# Patient Record
Sex: FEMALE | Race: OTHER | Employment: UNEMPLOYED | URBAN - METROPOLITAN AREA
[De-identification: names, ages, dates, MRNs, and addresses within clinical notes are randomized per-mention and may not be internally consistent; named-entity substitution may affect disease eponyms.]

---

## 2017-04-08 ENCOUNTER — HOSPITAL ENCOUNTER (EMERGENCY)
Facility: HOSPITAL | Age: 42
Discharge: HOME | End: 2017-04-09
Payer: MEDICAID

## 2017-04-08 ENCOUNTER — APPOINTMENT (OUTPATIENT)
Dept: GENERAL RADIOLOGY | Facility: HOSPITAL | Age: 42
End: 2017-04-08
Payer: MEDICAID

## 2017-04-08 VITALS
SYSTOLIC BLOOD PRESSURE: 109 MMHG | OXYGEN SATURATION: 97 % | DIASTOLIC BLOOD PRESSURE: 67 MMHG | HEART RATE: 76 BPM | HEIGHT: 64 IN | BODY MASS INDEX: 27.31 KG/M2 | WEIGHT: 160 LBS | RESPIRATION RATE: 18 BRPM | TEMPERATURE: 98 F

## 2017-04-08 DIAGNOSIS — S90.31XA CONTUSION OF RIGHT FOOT, INITIAL ENCOUNTER: Primary | ICD-10-CM

## 2017-04-08 PROCEDURE — 99283 EMERGENCY DEPT VISIT LOW MDM: CPT

## 2017-04-08 PROCEDURE — 73630 X-RAY EXAM OF FOOT: CPT

## 2017-04-08 PROCEDURE — 73610 X-RAY EXAM OF ANKLE: CPT

## 2017-04-09 NOTE — ED PROVIDER NOTES
Patient Seen in: [facility] AND [facility] Emergency Department    History   Patient presents with:  Lower Extremity Injury (musculoskeletal)    Stated Complaint: Right foot, stepped on a toy last Friday, pain and redness    HPI    One week ago stepped on a chil Impression:  Contusion of right foot, initial encounter  (primary encounter diagnosis)    Disposition:  Discharge    Follow-up:  Janice Johnson, [address] [phone]    In 1 week        Medications Prescribe

## 2017-04-09 NOTE — ED NOTES
C/o rt foot pain 4/5 digit. States 1 wk ago stepped on a mamadou toy on side of foot. No distress, did not get seen. staes pain increasing past few day at same site.  0 bruising, 0 swelling, + cms

## 2017-10-04 ENCOUNTER — HOSPITAL ENCOUNTER (OUTPATIENT)
Age: 42
Discharge: HOME | End: 2017-10-04
Attending: EMERGENCY MEDICINE
Payer: MEDICAID

## 2017-10-04 VITALS
TEMPERATURE: 98 F | WEIGHT: 161 LBS | RESPIRATION RATE: 18 BRPM | DIASTOLIC BLOOD PRESSURE: 73 MMHG | SYSTOLIC BLOOD PRESSURE: 104 MMHG | HEART RATE: 67 BPM | OXYGEN SATURATION: 98 % | BODY MASS INDEX: 28 KG/M2

## 2017-10-04 DIAGNOSIS — S60.562A INSECT BITE HAND, LEFT, INITIAL ENCOUNTER: Primary | ICD-10-CM
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
## 2017-10-04 DIAGNOSIS — L03.114 CELLULITIS OF LEFT HAND: ICD-10-CM

## 2017-10-04 PROCEDURE — 99214 OFFICE O/P EST MOD 30 MIN: CPT

## 2017-10-04 PROCEDURE — 99213 OFFICE O/P EST LOW 20 MIN: CPT

## 2017-10-04 RX ORDER — LORAZEPAM 0.5 MG/1
TABLET ORAL
Refills: 0 | COMMUNITY
Start: 2017-09-30

## 2017-10-04 RX ORDER — CETIRIZINE HYDROCHLORIDE 10 MG/1
TABLET ORAL
Refills: 1 | COMMUNITY
Start: 2017-09-13

## 2017-10-04 RX ORDER — CLINDAMYCIN HYDROCHLORIDE 300 MG/1
300 CAPSULE ORAL 3 TIMES DAILY
Qty: 15 CAPSULE | Refills: 0 | Status: SHIPPED | OUTPATIENT
Start: 2017-10-04 | End: 2017-10-09

## 2017-10-04 RX ORDER — FLUTICASONE PROPIONATE 50 MCG
SPRAY, SUSPENSION (ML) NASAL
Refills: 0 | COMMUNITY
Start: 2017-09-13 | End: 2019-04-22

## 2017-10-04 NOTE — ED PROVIDER NOTES
Patient Seen in: [facility] AND CLINICS Immediate Care In [address] [place] [address]    History   Patient presents with:  Upper Extremity Injury (musculoskeletal)    Stated Complaint: lt hand redness    HPI    The patient is a 25-year-old female with no significant past medica is 5 out of 5 and symmetric in the upper and lower extremities bilaterally  Extremities: Mild swelling and minimal tenderness overlying the dorsal aspect of the left second MCP joint with normal range of motion of the fingers and wrist.  Skin: There is mil

## 2017-11-12 ENCOUNTER — HOSPITAL ENCOUNTER (OUTPATIENT)
Age: 42
Discharge: HOME | End: 2017-11-12
Payer: MEDICAID

## 2017-11-12 VITALS
RESPIRATION RATE: 16 BRPM | HEIGHT: 64 IN | HEART RATE: 77 BPM | DIASTOLIC BLOOD PRESSURE: 78 MMHG | OXYGEN SATURATION: 97 % | SYSTOLIC BLOOD PRESSURE: 115 MMHG | TEMPERATURE: 98 F | WEIGHT: 160 LBS | BODY MASS INDEX: 27.31 KG/M2

## 2017-11-12 DIAGNOSIS — J06.9 VIRAL URI WITH COUGH: Primary | ICD-10-CM

## 2017-11-12 PROCEDURE — 99212 OFFICE O/P EST SF 10 MIN: CPT

## 2017-11-12 NOTE — ED PROVIDER NOTES
Patient presents with:  Cough/URI      HPI:     Alen Hair is a 40year old female who presents for evaluation and management of a chief complaint of cough, runny nose and bodyaches.  Pt reports her daughter was sick a week ago and she now has similar sy effort    MDM/Assessment/Plan:   Orders for this encounter:    Well appearing 39 y/o female presents with cough, runny nose and body aches. Patient has not experienced any fevers. Patient is nontoxic in appearance.   Examination and symptoms consistent wi

## 2017-11-12 NOTE — ED INITIAL ASSESSMENT (HPI)
Coughing at night with body aches. Denies fevers, chills. STates she received the flu shot 2 weeks ago but her daughter was sick with a cough and croup at home last week. C/O left eye tearing, redness with burning that started this morning.

## 2018-02-27 ENCOUNTER — HOSPITAL ENCOUNTER (OUTPATIENT)
Age: 43
Discharge: ANOTHER HOSPITAL | End: 2018-02-27
Attending: EMERGENCY MEDICINE
Payer: MEDICAID

## 2018-02-27 ENCOUNTER — APPOINTMENT (OUTPATIENT)
Dept: CT IMAGING | Facility: HOSPITAL | Age: 43
End: 2018-02-27
Attending: EMERGENCY MEDICINE
Payer: MEDICAID

## 2018-02-27 ENCOUNTER — HOSPITAL ENCOUNTER (EMERGENCY)
Facility: HOSPITAL | Age: 43
Discharge: HOME | End: 2018-02-27
Attending: EMERGENCY MEDICINE
Payer: MEDICAID

## 2018-02-27 ENCOUNTER — APPOINTMENT (OUTPATIENT)
Dept: GENERAL RADIOLOGY | Facility: HOSPITAL | Age: 43
End: 2018-02-27
Attending: EMERGENCY MEDICINE
Payer: MEDICAID

## 2018-02-27 VITALS
DIASTOLIC BLOOD PRESSURE: 65 MMHG | TEMPERATURE: 98 F | RESPIRATION RATE: 16 BRPM | WEIGHT: 154 LBS | BODY MASS INDEX: 26.29 KG/M2 | SYSTOLIC BLOOD PRESSURE: 112 MMHG | HEIGHT: 64 IN | HEART RATE: 78 BPM | OXYGEN SATURATION: 99 %

## 2018-02-27 VITALS
RESPIRATION RATE: 18 BRPM | TEMPERATURE: 99 F | HEIGHT: 64 IN | SYSTOLIC BLOOD PRESSURE: 131 MMHG | HEART RATE: 75 BPM | WEIGHT: 154 LBS | OXYGEN SATURATION: 99 % | BODY MASS INDEX: 26.29 KG/M2 | DIASTOLIC BLOOD PRESSURE: 65 MMHG

## 2018-02-27 DIAGNOSIS — S20.211A CONTUSION OF RIB ON RIGHT SIDE, INITIAL ENCOUNTER: Primary | ICD-10-CM
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
## 2018-02-27 DIAGNOSIS — R10.9 ABDOMINAL PAIN, ACUTE: ICD-10-CM

## 2018-02-27 LAB
ANION GAP SERPL CALC-SCNC: 10 MMOL/L (ref 0–18)
BASOPHILS # BLD: 0 K/UL (ref 0–0.2)
BASOPHILS NFR BLD: 1 %
BUN SERPL-MCNC: 4 MG/DL (ref 8–20)
BUN/CREAT SERPL: 6.7 (ref 10–20)
CALCIUM SERPL-MCNC: 9.2 MG/DL (ref 8.5–10.5)
CHLORIDE SERPL-SCNC: 104 MMOL/L (ref 95–110)
CO2 SERPL-SCNC: 24 MMOL/L (ref 22–32)
CREAT SERPL-MCNC: 0.6 MG/DL (ref 0.5–1.5)
EOSINOPHIL # BLD: 0.1 K/UL (ref 0–0.7)
EOSINOPHIL NFR BLD: 1 %
ERYTHROCYTE [DISTWIDTH] IN BLOOD BY AUTOMATED COUNT: 13 % (ref 11–15)
GLUCOSE SERPL-MCNC: 91 MG/DL (ref 70–99)
HCT VFR BLD AUTO: 40 % (ref 35–48)
HGB BLD-MCNC: 13.6 G/DL (ref 12–16)
LYMPHOCYTES # BLD: 1.4 K/UL (ref 1–4)
LYMPHOCYTES NFR BLD: 23 %
MCH RBC QN AUTO: 30.1 PG (ref 27–32)
MCHC RBC AUTO-ENTMCNC: 33.9 G/DL (ref 32–37)
MCV RBC AUTO: 88.8 FL (ref 80–100)
MONOCYTES # BLD: 0.3 K/UL (ref 0–1)
MONOCYTES NFR BLD: 4 %
NEUTROPHILS # BLD AUTO: 4.5 K/UL (ref 1.8–7.7)
NEUTROPHILS NFR BLD: 72 %
OSMOLALITY UR CALC.SUM OF ELEC: 282 MOSM/KG (ref 275–295)
PLATELET # BLD AUTO: 246 K/UL (ref 140–400)
PMV BLD AUTO: 7.8 FL (ref 7.4–10.3)
POTASSIUM SERPL-SCNC: 4 MMOL/L (ref 3.3–5.1)
RBC # BLD AUTO: 4.51 M/UL (ref 3.7–5.4)
SODIUM SERPL-SCNC: 138 MMOL/L (ref 136–144)
WBC # BLD AUTO: 6.4 K/UL (ref 4–11)

## 2018-02-27 PROCEDURE — 85025 COMPLETE CBC W/AUTO DIFF WBC: CPT | Performed by: EMERGENCY MEDICINE

## 2018-02-27 PROCEDURE — 71101 X-RAY EXAM UNILAT RIBS/CHEST: CPT | Performed by: EMERGENCY MEDICINE

## 2018-02-27 PROCEDURE — 80048 BASIC METABOLIC PNL TOTAL CA: CPT | Performed by: EMERGENCY MEDICINE

## 2018-02-27 PROCEDURE — 74177 CT ABD & PELVIS W/CONTRAST: CPT | Performed by: EMERGENCY MEDICINE

## 2018-02-27 PROCEDURE — 99213 OFFICE O/P EST LOW 20 MIN: CPT

## 2018-02-27 PROCEDURE — 36415 COLL VENOUS BLD VENIPUNCTURE: CPT

## 2018-02-27 PROCEDURE — 99284 EMERGENCY DEPT VISIT MOD MDM: CPT

## 2018-02-27 RX ORDER — VALACYCLOVIR HYDROCHLORIDE 1 G/1
TABLET, FILM COATED ORAL EVERY 12 HOURS SCHEDULED
COMMUNITY

## 2018-02-27 RX ORDER — HYDROCODONE BITARTRATE AND ACETAMINOPHEN 5; 325 MG/1; MG/1
2 TABLET ORAL ONCE
Status: COMPLETED | OUTPATIENT
Start: 2018-02-27 | End: 2018-02-27

## 2018-02-27 RX ORDER — IBUPROFEN 600 MG/1
600 TABLET ORAL EVERY 8 HOURS PRN
Qty: 20 TABLET | Refills: 0 | Status: SHIPPED | OUTPATIENT
Start: 2018-02-27 | End: 2018-03-06
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
## 2018-02-27 NOTE — ED PROVIDER NOTES
Patient Seen in: [facility] AND CLINICS Immediate Care In [address] [place] [address]    History   Patient presents with:  Trauma (cardiovascular, musculoskeletal)    Stated Complaint: Fall/Rt Side Rib Pain    HPI  Yesterday the patient slipped in the bathroom striking her righ HENT:   Head: Normocephalic and atraumatic. Right Ear: External ear normal.   Left Ear: External ear normal.   Eyes: Conjunctivae and EOM are normal.   Neck: Normal range of motion. Neck supple.    Cardiovascular: Normal rate, regular rhythm, normal heart

## 2018-02-27 NOTE — ED NOTES
Care assumed from triage. Patient presents s/p fall at home this am, states she was getting into the shower when she realized water was too hot and \"jumped out and slipped\", hit R flank against tub. Denies head injury, denies LOC.  Pt states pain is aggra

## 2018-02-27 NOTE — ED INITIAL ASSESSMENT (HPI)
Slipped in shower last night and fell onto tub slamming onto rt post rib back area with slight discoloration and scratches noted easy sl painfully resp. stts was dizzy afterwards and felt like fainting -hard to deep breath.  Took an [facility] she had with s

## 2018-02-27 NOTE — ED NOTES
Discharged home with plan to follow up with PCP as indicated. Alert and interactive. Hemodynamically stable. Agrees with proposed care plan. Ambulates to exit with steady gait.

## 2018-02-27 NOTE — ED PROVIDER NOTES
Patient Seen in: [facility] AND [facility] Emergency Department    History   Patient presents with:  Fall (musculoskeletal, neurologic)    Stated Complaint: R hip and lower back pain from fall    HPI    45-year-old female with no significant past medical history HPI.  Constitutional and vital signs reviewed. All other systems reviewed and negative except as noted above.     Physical Exam   ED Triage Vitals [02/27/18 1230]  BP: 121/71  Pulse: 72  Resp: 18  Temp: 98.8 °F (37.1 °C)  Temp src: Oral  SpO2: 99 %  O2 mmol/L   Potassium 4.0 3.3 - 5.1 mmol/L   Chloride 104 95 - 110 mmol/L   CO2 24 22 - 32 mmol/L   BUN 4 (L) 8 - 20 mg/dL   Creatinine 0.60 0.50 - 1.50 mg/dL   Calcium, Total 9.2 8.5 - 10.5 mg/dL   BUN/CREA Ratio 6.7 (L) 10.0 - 20.0   Anion Gap 10 0 - 18 mmo leukocytosis, no anemia, XR without fracture/pneumothorax, CT without liver laceration hematoma  - norco was given prior to me seeing pt  - supportive care discussed      Medical Record Review: I personally reviewed available prior medical records for any

## 2018-07-25 ENCOUNTER — HOSPITAL ENCOUNTER (OUTPATIENT)
Age: 43
Discharge: HOME | End: 2018-07-25
Attending: FAMILY MEDICINE
Payer: MEDICAID

## 2018-07-25 VITALS
SYSTOLIC BLOOD PRESSURE: 123 MMHG | TEMPERATURE: 98 F | HEART RATE: 90 BPM | RESPIRATION RATE: 16 BRPM | DIASTOLIC BLOOD PRESSURE: 80 MMHG

## 2018-07-25 DIAGNOSIS — G50.1 ATYPICAL FACIAL PAIN: Primary | ICD-10-CM

## 2018-07-25 PROCEDURE — 99212 OFFICE O/P EST SF 10 MIN: CPT

## 2018-07-25 NOTE — ED PROVIDER NOTES
Pt seen in [facility] AND CLINICS Immediate Care In [place]      Stated Complaint: Patient presents with:  Nose Problem      --------------   RN assessment:     L sided nose pain/HA s/p dental work 2 d ago    --------------    CC: \"weird thing,\"    HPI:  Cor 16  Temp: 97.8 °F (36.6 °C)  Temp src: Oral  SpO2: n/a  O2 Device: n/a    Physical Exam:   General :    Alert, cooperative, no distress, appears stated age   Head:    Normocephalic, without obvious abnormality, atraumatic   Eyes:    PERRL, conjunctiva/corn Prescribed:    Current Discharge Medication List

## 2018-10-23 ENCOUNTER — HOSPITAL ENCOUNTER (OUTPATIENT)
Age: 43
Discharge: HOME | End: 2018-10-23
Attending: FAMILY MEDICINE
Payer: MEDICAID

## 2018-10-23 DIAGNOSIS — J02.9 ACUTE PHARYNGITIS, UNSPECIFIED ETIOLOGY: Primary | ICD-10-CM

## 2018-10-23 DIAGNOSIS — Z20.818 EXPOSURE TO STREP THROAT: ICD-10-CM

## 2018-10-23 PROCEDURE — 99213 OFFICE O/P EST LOW 20 MIN: CPT

## 2018-10-23 PROCEDURE — 99214 OFFICE O/P EST MOD 30 MIN: CPT

## 2018-10-23 RX ORDER — AZITHROMYCIN 250 MG/1
TABLET, FILM COATED ORAL
Qty: 1 PACKAGE | Refills: 0 | Status: SHIPPED | OUTPATIENT
Start: 2018-10-23 | End: 2019-02-11

## 2018-10-23 NOTE — ED PROVIDER NOTES
Patient presents with:  Sore Throat      HPI:     Cherylene Finders is a 44year old female who presents with for chief complaint of  sore throat  X 1 days. Patient's daughter was diagnosed with strep after positive strep test here in the clinic today.   The pharyngitis, unspecified etiology J02.9    2. Exposure to strep throat Z20.818        Take OTC Ibuprofen TID prn throat pain/fever.    Salt water gargles  Push po fluids    Z-Stephane to start if notes worsening symptoms or onset of fever and chills    Orders Pl

## 2019-01-02 ENCOUNTER — TELEPHONE (OUTPATIENT)
Dept: OBGYN | Age: 44
End: 2019-01-02

## 2019-01-02 ENCOUNTER — APPOINTMENT (OUTPATIENT)
Dept: LAB | Age: 44
End: 2019-01-02

## 2019-01-02 DIAGNOSIS — Z87.59 HISTORY OF MISCARRIAGE: Primary | ICD-10-CM

## 2019-01-02 DIAGNOSIS — Z32.01 POSITIVE URINE PREGNANCY TEST: Primary | ICD-10-CM

## 2019-01-02 LAB
HCG SERPL-ACNC: 32 MUNITS/ML
PROGEST SERPL-MCNC: 8.56 NG/ML

## 2019-01-02 PROCEDURE — 36415 COLL VENOUS BLD VENIPUNCTURE: CPT | Performed by: OBSTETRICS & GYNECOLOGY

## 2019-01-02 PROCEDURE — 84144 ASSAY OF PROGESTERONE: CPT | Performed by: OBSTETRICS & GYNECOLOGY

## 2019-01-02 PROCEDURE — 84702 CHORIONIC GONADOTROPIN TEST: CPT | Performed by: OBSTETRICS & GYNECOLOGY

## 2019-01-03 RX ORDER — VITAMIN A ACETATE, BETA CAROTENE, ASCORBIC ACID, CHOLECALCIFEROL, .ALPHA.-TOCOPHEROL ACETATE, DL-, THIAMINE MONONITRATE, RIBOFLAVIN, NIACINAMIDE, PYRIDOXINE HYDROCHLORIDE, FOLIC ACID, CYANOCOBALAMIN, CALCIUM CARBONATE, FERROUS FUMARATE, ZINC OXIDE, CUPRIC OXIDE 3080; 12; 120; 400; 1; 1.84; 3; 20; 22; 920; 25; 200; 27; 10; 2 [IU]/1; UG/1; MG/1; [IU]/1; MG/1; MG/1; MG/1; MG/1; MG/1; [IU]/1; MG/1; MG/1; MG/1; MG/1; MG/1
1 TABLET, FILM COATED ORAL DAILY
Qty: 30 TABLET | Refills: 11 | Status: SHIPPED | OUTPATIENT
Start: 2019-01-03

## 2019-01-04 PROCEDURE — 84702 CHORIONIC GONADOTROPIN TEST: CPT | Performed by: OBSTETRICS & GYNECOLOGY

## 2019-01-04 PROCEDURE — 36415 COLL VENOUS BLD VENIPUNCTURE: CPT | Performed by: OBSTETRICS & GYNECOLOGY

## 2019-01-05 LAB — HCG SERPL-ACNC: 30 MUNITS/ML

## 2019-01-07 ENCOUNTER — TELEPHONE (OUTPATIENT)
Dept: OBGYN | Age: 44
End: 2019-01-07

## 2019-01-07 DIAGNOSIS — O20.0 THREATENED ABORTION: Primary | ICD-10-CM

## 2019-01-08 ENCOUNTER — OFFICE VISIT (OUTPATIENT)
Dept: OBGYN | Age: 44
End: 2019-01-08

## 2019-01-08 ENCOUNTER — HOSPITAL (OUTPATIENT)
Dept: OTHER | Age: 44
End: 2019-01-08
Attending: OBSTETRICS & GYNECOLOGY

## 2019-01-08 ENCOUNTER — HOSPITAL (OUTPATIENT)
Dept: OTHER | Age: 44
End: 2019-01-08

## 2019-01-08 ENCOUNTER — APPOINTMENT (OUTPATIENT)
Dept: OBGYN | Age: 44
End: 2019-01-08

## 2019-01-08 VITALS — SYSTOLIC BLOOD PRESSURE: 126 MMHG | BODY MASS INDEX: 25.15 KG/M2 | WEIGHT: 142 LBS | DIASTOLIC BLOOD PRESSURE: 70 MMHG

## 2019-01-08 DIAGNOSIS — O02.1 MISSED AB: ICD-10-CM

## 2019-01-08 DIAGNOSIS — O20.9 BLEEDING IN EARLY PREGNANCY: ICD-10-CM

## 2019-01-08 DIAGNOSIS — O36.80X0 PREGNANCY OF UNKNOWN ANATOMIC LOCATION: Primary | ICD-10-CM

## 2019-01-08 LAB
ANALYZER ANC (IANC): ABNORMAL
ANALYZER ANC (IANC): ABNORMAL
BASOPHILS # BLD: 0 K/MCL (ref 0–0.3)
BASOPHILS # BLD: 0 THOUSAND/MCL (ref 0–0.3)
BASOPHILS NFR BLD: 0 %
BASOPHILS NFR BLD: 0 %
DIFFERENTIAL METHOD BLD: ABNORMAL
DIFFERENTIAL METHOD BLD: ABNORMAL
EOSINOPHIL # BLD: 0 K/MCL (ref 0.1–0.5)
EOSINOPHIL # BLD: 0 THOUSAND/MCL (ref 0.1–0.5)
EOSINOPHIL NFR BLD: 0 %
EOSINOPHIL NFR BLD: 0 %
ERYTHROCYTE [DISTWIDTH] IN BLOOD: 13.1 % (ref 11–15)
ERYTHROCYTE [DISTWIDTH] IN BLOOD: 13.1 % (ref 11–15)
HCT VFR BLD CALC: 39.2 % (ref 36–46.5)
HEMATOCRIT: 39.2 % (ref 36–46.5)
HGB BLD-MCNC: 12.7 G/DL (ref 12–15.5)
HGB BLD-MCNC: 12.7 GM/DL (ref 12–15.5)
IMM GRANULOCYTES # BLD AUTO: 0 K/MCL (ref 0–0.2)
IMM GRANULOCYTES # BLD AUTO: 0 THOUSAND/MCL (ref 0–0.2)
IMM GRANULOCYTES NFR BLD: 0 %
IMM GRANULOCYTES NFR BLD: 0 %
LYMPHOCYTES # BLD: 1.6 K/MCL (ref 1–4.8)
LYMPHOCYTES # BLD: 1.6 THOUSAND/MCL (ref 1–4.8)
LYMPHOCYTES NFR BLD: 14 %
LYMPHOCYTES NFR BLD: 14 %
MCH RBC QN AUTO: 29.5 PG (ref 26–34)
MCH RBC QN AUTO: 29.5 PG (ref 26–34)
MCHC RBC AUTO-ENTMCNC: 32.4 G/DL (ref 32–36.5)
MCHC RBC AUTO-ENTMCNC: 32.4 GM/DL (ref 32–36.5)
MCV RBC AUTO: 91.2 FL (ref 78–100)
MCV RBC AUTO: 91.2 FL (ref 78–100)
MONOCYTES # BLD: 0.6 K/MCL (ref 0.3–0.9)
MONOCYTES # BLD: 0.6 THOUSAND/MCL (ref 0.3–0.9)
MONOCYTES NFR BLD: 5 %
MONOCYTES NFR BLD: 5 %
NEUTROPHILS # BLD: 9.7 K/MCL (ref 1.8–7.7)
NEUTROPHILS # BLD: 9.7 THOUSAND/MCL (ref 1.8–7.7)
NEUTROPHILS NFR BLD: 81 %
NEUTROPHILS NFR BLD: 81 %
NEUTS SEG NFR BLD: ABNORMAL %
NEUTS SEG NFR BLD: ABNORMAL %
NRBC (NRBCRE): 0 /100 WBC
NRBC (NRBCRE): 0 /100 WBC
PLATELET # BLD: 280 K/MCL (ref 140–450)
PLATELET # BLD: 280 THOUSAND/MCL (ref 140–450)
RBC # BLD: 4.3 MIL/MCL (ref 4–5.2)
RBC # BLD: 4.3 MILLION/MCL (ref 4–5.2)
WBC # BLD: 12 K/MCL (ref 4.2–11)
WBC # BLD: 12 THOUSAND/MCL (ref 4.2–11)

## 2019-01-08 PROCEDURE — 99214 OFFICE O/P EST MOD 30 MIN: CPT | Performed by: OBSTETRICS & GYNECOLOGY

## 2019-01-08 PROCEDURE — 76830 TRANSVAGINAL US NON-OB: CPT | Performed by: OBSTETRICS & GYNECOLOGY

## 2019-01-08 PROCEDURE — 76376 3D RENDER W/INTRP POSTPROCES: CPT | Performed by: OBSTETRICS & GYNECOLOGY

## 2019-01-08 SDOH — HEALTH STABILITY: MENTAL HEALTH: HOW OFTEN DO YOU HAVE A DRINK CONTAINING ALCOHOL?: NEVER

## 2019-01-08 ASSESSMENT — ENCOUNTER SYMPTOMS
BACK PAIN: 1
SHORTNESS OF BREATH: 0

## 2019-01-09 ENCOUNTER — TELEPHONE (OUTPATIENT)
Dept: OBGYN | Age: 44
End: 2019-01-09

## 2019-01-09 DIAGNOSIS — G89.18 POSTOPERATIVE PAIN: Primary | ICD-10-CM

## 2019-01-09 RX ORDER — HYDROCODONE BITARTRATE AND ACETAMINOPHEN 5; 325 MG/1; MG/1
1 TABLET ORAL EVERY 4 HOURS PRN
Qty: 10 TABLET | Refills: 0 | Status: SHIPPED | OUTPATIENT
Start: 2019-01-09

## 2019-01-11 ENCOUNTER — NURSE ONLY (OUTPATIENT)
Dept: OBGYN | Age: 44
End: 2019-01-11

## 2019-01-11 DIAGNOSIS — O02.1 MISSED AB: Primary | ICD-10-CM

## 2019-01-11 LAB
HCG SERPL-ACNC: <2 MUNITS/ML
PATHOLOGIST NAME: NORMAL

## 2019-01-11 PROCEDURE — 36415 COLL VENOUS BLD VENIPUNCTURE: CPT

## 2019-01-11 PROCEDURE — 84702 CHORIONIC GONADOTROPIN TEST: CPT | Performed by: OBSTETRICS & GYNECOLOGY

## 2019-01-25 ENCOUNTER — E-ADVICE (OUTPATIENT)
Dept: OBGYN | Age: 44
End: 2019-01-25

## 2019-01-25 ENCOUNTER — OFFICE VISIT (OUTPATIENT)
Dept: OBGYN | Age: 44
End: 2019-01-25

## 2019-01-25 VITALS
SYSTOLIC BLOOD PRESSURE: 114 MMHG | DIASTOLIC BLOOD PRESSURE: 72 MMHG | HEIGHT: 64 IN | WEIGHT: 151 LBS | BODY MASS INDEX: 25.78 KG/M2

## 2019-01-25 DIAGNOSIS — Z09 POSTOPERATIVE FOLLOW-UP: Primary | ICD-10-CM

## 2019-01-25 PROCEDURE — 99024 POSTOP FOLLOW-UP VISIT: CPT | Performed by: OBSTETRICS & GYNECOLOGY

## 2019-02-06 ENCOUNTER — TELEPHONE (OUTPATIENT)
Dept: OBGYN | Age: 44
End: 2019-02-06

## 2019-02-11 ENCOUNTER — HOSPITAL ENCOUNTER (OUTPATIENT)
Age: 44
Discharge: HOME | End: 2019-02-11
Attending: EMERGENCY MEDICINE
Payer: MEDICAID

## 2019-02-11 VITALS
TEMPERATURE: 97 F | BODY MASS INDEX: 23.9 KG/M2 | DIASTOLIC BLOOD PRESSURE: 71 MMHG | HEART RATE: 73 BPM | HEIGHT: 64 IN | WEIGHT: 140 LBS | OXYGEN SATURATION: 100 % | SYSTOLIC BLOOD PRESSURE: 112 MMHG | RESPIRATION RATE: 18 BRPM

## 2019-02-11 DIAGNOSIS — R51.9 FACE PAIN: Primary | ICD-10-CM

## 2019-02-11 DIAGNOSIS — H92.02 EAR PAIN, LEFT: ICD-10-CM

## 2019-02-11 PROCEDURE — 99212 OFFICE O/P EST SF 10 MIN: CPT

## 2019-02-11 NOTE — ED PROVIDER NOTES
Patient Seen in: [facility] Immediate Care In [address] [place] [address]    History   Patient presents with:  Ear Problem Pain (neurosensory)    Stated Complaint: ear pain    HPI    45 yo female with left ear pain since yesterday. No recent URI symptoms.  She was hi deficit. She exhibits normal muscle tone. Skin: Skin is warm and dry. Capillary refill takes less than 2 seconds. Psychiatric: She has a normal mood and affect. Her behavior is normal.   Nursing note and vitals reviewed.            ED Course   Labs Revi

## 2019-03-05 ENCOUNTER — TELEPHONE (OUTPATIENT)
Dept: OBGYN | Age: 44
End: 2019-03-05

## 2019-03-07 ENCOUNTER — TELEPHONE (OUTPATIENT)
Dept: OBGYN | Age: 44
End: 2019-03-07

## 2019-03-16 ENCOUNTER — HOSPITAL ENCOUNTER (EMERGENCY)
Facility: HOSPITAL | Age: 44
Discharge: HOME | End: 2019-03-17
Payer: MEDICAID

## 2019-03-16 ENCOUNTER — APPOINTMENT (OUTPATIENT)
Dept: GENERAL RADIOLOGY | Facility: HOSPITAL | Age: 44
End: 2019-03-16
Payer: MEDICAID

## 2019-03-16 DIAGNOSIS — S91.342A PUNCTURE WOUND OF LEFT FOOT WITH FOREIGN BODY, INITIAL ENCOUNTER: Primary | ICD-10-CM

## 2019-03-16 PROCEDURE — 73630 X-RAY EXAM OF FOOT: CPT

## 2019-03-16 PROCEDURE — 99283 EMERGENCY DEPT VISIT LOW MDM: CPT

## 2019-03-17 VITALS
WEIGHT: 135 LBS | TEMPERATURE: 97 F | HEIGHT: 64 IN | RESPIRATION RATE: 16 BRPM | HEART RATE: 62 BPM | DIASTOLIC BLOOD PRESSURE: 76 MMHG | OXYGEN SATURATION: 100 % | SYSTOLIC BLOOD PRESSURE: 117 MMHG | BODY MASS INDEX: 23.05 KG/M2

## 2019-03-17 RX ORDER — TRAMADOL HYDROCHLORIDE 50 MG/1
50 TABLET ORAL EVERY 12 HOURS PRN
Qty: 2 TABLET | Refills: 0 | Status: SHIPPED | OUTPATIENT
Start: 2019-03-17 | End: 2019-03-18

## 2019-03-17 RX ORDER — DOXYCYCLINE HYCLATE 100 MG/1
100 CAPSULE ORAL 2 TIMES DAILY
Qty: 14 CAPSULE | Refills: 0 | Status: SHIPPED | OUTPATIENT
Start: 2019-03-17 | End: 2019-03-24
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
## 2019-03-20 ENCOUNTER — TELEPHONE (OUTPATIENT)
Dept: OBGYN CLINIC | Facility: CLINIC | Age: 44
End: 2019-03-20

## 2019-03-20 NOTE — TELEPHONE ENCOUNTER
RECEIVED RECORDS FROM URGENT CARE VISITS OVER PAST FEW YEARS. THERE ARE NO PAPS, MAMMOS, PELVIC ULTRASOUNDS. THERE IS 1 PELVIC CT WITH NO ABNORMAL GYNE FINDINGS. RECORDS IN FRONT OFFICE FOR 4-22-19 APPT WITH RAYMUNDO.

## 2019-04-04 ENCOUNTER — TELEPHONE (OUTPATIENT)
Dept: OBGYN | Age: 44
End: 2019-04-04

## 2019-04-18 ENCOUNTER — TELEPHONE (OUTPATIENT)
Dept: OBGYN CLINIC | Facility: CLINIC | Age: 44
End: 2019-04-18

## 2019-04-18 ENCOUNTER — TELEPHONE (OUTPATIENT)
Dept: OBGYN | Age: 44
End: 2019-04-18

## 2019-04-18 NOTE — TELEPHONE ENCOUNTER
Pt stated fax was sent from [facility], pls call when received.

## 2019-04-18 NOTE — TELEPHONE ENCOUNTER
NOTIFIED PT SHE WILL NEED TO CONTACT [facility] TO GET A COPY OF THE OP REPORT. SHE IS GOING TO CALL THERE AND TRY TO GO THERE TO  THE REPORT, TODAY IF POSSIBLE.

## 2019-04-18 NOTE — TELEPHONE ENCOUNTER
Pt informed we received her 2 week post op visit notes from Dr. Sterling Thurston after pt had her laparoscopy suction D&C for incomplete . Pt also informed that we received surgical pathology from [address] [place] [address] that were sent to path during surgery.  Explained to pt th

## 2019-04-18 NOTE — TELEPHONE ENCOUNTER
Pt informed that the only records we received are from urgent care visits. Pt informed of note from 3/20/19 comm. Pt stated she will call previous office and ask for records to be faxed or she will go pick them up herself. Pt has appt with RAYMUNDO on 4/22.

## 2019-04-18 NOTE — TELEPHONE ENCOUNTER
Lizette from Dr. Percy Jones office called and stated she is unable to fax operative reports, for that request,  medical records at [address] [place] [address] needs to be contacted. And pt needs to also fill out a release form to do so.   Lizette can be reached with further questions at

## 2019-04-22 ENCOUNTER — OFFICE VISIT (OUTPATIENT)
Dept: OBGYN CLINIC | Facility: CLINIC | Age: 44
End: 2019-04-22
Payer: MEDICAID

## 2019-04-22 VITALS
SYSTOLIC BLOOD PRESSURE: 111 MMHG | HEART RATE: 89 BPM | DIASTOLIC BLOOD PRESSURE: 76 MMHG | BODY MASS INDEX: 25 KG/M2 | WEIGHT: 147 LBS

## 2019-04-22 DIAGNOSIS — Z01.419 ENCOUNTER FOR GYNECOLOGICAL EXAMINATION: Primary | ICD-10-CM

## 2019-04-22 DIAGNOSIS — Z12.31 ENCOUNTER FOR SCREENING MAMMOGRAM FOR BREAST CANCER: ICD-10-CM

## 2019-04-22 PROCEDURE — 99386 PREV VISIT NEW AGE 40-64: CPT | Performed by: OBSTETRICS & GYNECOLOGY

## 2019-04-22 NOTE — PROGRESS NOTES
Deneice Meigs is a 44year old female  Patient's last menstrual period was 2019. here for annual exam.       New pt. Friend of [employer]. Menses q month. Trying to get pregnant.   Has positive ovulation test.      Had miscarriage in 1 headaches, extremity weakness or numbness. Psychiatric: denies depression or anxiety. Endocrine:   denies excessive thirst or urination. Heme/Lymph:  easy bruising or bleeding.     PHYSICAL EXAM:   /76   Pulse 89   Wt 147 lb (66.7 kg)   LMP 04/04/2

## 2019-05-02 ENCOUNTER — TELEPHONE (OUTPATIENT)
Dept: OBGYN CLINIC | Facility: CLINIC | Age: 44
End: 2019-05-02

## 2019-05-02 NOTE — TELEPHONE ENCOUNTER
Received pt signed PORSCHE for records to go to Dr Black Fishman. Form faxed to [employer] for processing and then to scanning.

## 2019-05-06 ENCOUNTER — HOSPITAL ENCOUNTER (OUTPATIENT)
Dept: MAMMOGRAPHY | Age: 44
Discharge: HOME | End: 2019-05-06
Attending: OBSTETRICS & GYNECOLOGY
Payer: MEDICAID

## 2019-05-06 DIAGNOSIS — Z12.31 ENCOUNTER FOR SCREENING MAMMOGRAM FOR BREAST CANCER: ICD-10-CM

## 2019-05-06 PROCEDURE — 77063 BREAST TOMOSYNTHESIS BI: CPT | Performed by: OBSTETRICS & GYNECOLOGY

## 2019-05-06 PROCEDURE — 77067 SCR MAMMO BI INCL CAD: CPT | Performed by: OBSTETRICS & GYNECOLOGY

## 2019-05-13 ENCOUNTER — HOSPITAL ENCOUNTER (OUTPATIENT)
Dept: MAMMOGRAPHY | Facility: HOSPITAL | Age: 44
Discharge: HOME | End: 2019-05-13
Attending: OBSTETRICS & GYNECOLOGY
Payer: MEDICAID

## 2019-05-13 ENCOUNTER — HOSPITAL ENCOUNTER (OUTPATIENT)
Dept: ULTRASOUND IMAGING | Facility: HOSPITAL | Age: 44
Discharge: HOME | End: 2019-05-13
Attending: OBSTETRICS & GYNECOLOGY
Payer: MEDICAID

## 2019-05-13 DIAGNOSIS — R92.8 ABNORMAL MAMMOGRAM: ICD-10-CM

## 2019-05-13 PROCEDURE — 77065 DX MAMMO INCL CAD UNI: CPT | Performed by: OBSTETRICS & GYNECOLOGY

## 2019-05-13 PROCEDURE — 76642 ULTRASOUND BREAST LIMITED: CPT | Performed by: OBSTETRICS & GYNECOLOGY

## 2019-05-13 PROCEDURE — 77061 BREAST TOMOSYNTHESIS UNI: CPT | Performed by: OBSTETRICS & GYNECOLOGY

## 2019-11-16 ENCOUNTER — HOSPITAL ENCOUNTER (OUTPATIENT)
Age: 44
Discharge: OTHER INSTITUTION | End: 2019-11-16
Attending: FAMILY MEDICINE
Payer: MEDICAID

## 2019-11-16 VITALS
BODY MASS INDEX: 27.31 KG/M2 | OXYGEN SATURATION: 98 % | HEIGHT: 64 IN | HEART RATE: 95 BPM | DIASTOLIC BLOOD PRESSURE: 75 MMHG | WEIGHT: 160 LBS | SYSTOLIC BLOOD PRESSURE: 107 MMHG | RESPIRATION RATE: 18 BRPM | TEMPERATURE: 98 F

## 2019-11-16 DIAGNOSIS — M54.2 NECK PAIN: ICD-10-CM

## 2019-11-16 DIAGNOSIS — I77.6 VASCULITIS (HCC): Primary | ICD-10-CM

## 2019-11-16 PROCEDURE — 99212 OFFICE O/P EST SF 10 MIN: CPT

## 2019-11-16 PROCEDURE — 99213 OFFICE O/P EST LOW 20 MIN: CPT

## 2019-11-16 RX ORDER — NAPROXEN 500 MG/1
500 TABLET ORAL 2 TIMES DAILY WITH MEALS
COMMUNITY

## 2019-11-16 NOTE — ED INITIAL ASSESSMENT (HPI)
Pt to IC with cervical spine pain and pain and swelling with bruising in right hand. Pt denies trauma. States pain has been present x 4 days getting worse over time. Hx of pinched nerve in right shoulder.

## 2019-11-17 NOTE — ED PROVIDER NOTES
Patient Seen in: [facility] AND CLINICS Immediate Care In [address] [place] [address]      History   Patient presents with:  Neck Pain (musculoskeletal, neurologic)  Musculoskeletal Problem    Stated Complaint: neck/hand pain    HPI    Pt is a 39 yo with right neck and shoulder Rate and Rhythm: Normal rate and regular rhythm. Pulmonary:      Effort: Pulmonary effort is normal.      Breath sounds: Normal breath sounds. Musculoskeletal:      Comments: Right hand swelling and bruising all over hand in blotches.    Skin:

## 2020-07-19 ENCOUNTER — HOSPITAL ENCOUNTER (OUTPATIENT)
Age: 45
Discharge: HOME | End: 2020-07-19
Attending: EMERGENCY MEDICINE
Payer: MEDICAID

## 2020-07-19 VITALS
DIASTOLIC BLOOD PRESSURE: 81 MMHG | WEIGHT: 135 LBS | SYSTOLIC BLOOD PRESSURE: 120 MMHG | HEIGHT: 64 IN | HEART RATE: 81 BPM | RESPIRATION RATE: 18 BRPM | TEMPERATURE: 98 F | BODY MASS INDEX: 23.05 KG/M2 | OXYGEN SATURATION: 99 %

## 2020-07-19 DIAGNOSIS — B34.9 VIRAL SYNDROME: ICD-10-CM

## 2020-07-19 DIAGNOSIS — R50.9 FEVER: Primary | ICD-10-CM

## 2020-07-19 PROCEDURE — 99203 OFFICE O/P NEW LOW 30 MIN: CPT | Performed by: EMERGENCY MEDICINE

## 2020-07-19 NOTE — ED PROVIDER NOTES
Patient Seen in: [facility] Immediate Care In [place]      History   No chief complaint on file. Stated Complaint: fever/diarrhea    HPI  Patient has concerns for COVID. Her 5year-old daughter has had fever on and off for the last week.   Melody Power Exam  Vitals signs and nursing note reviewed. Constitutional:       General: She is not in acute distress. Appearance: She is well-developed. Comments: Well appearing   HENT:      Head: Normocephalic and atraumatic.       Right Ear: External ear List

## 2020-07-20 LAB — SARS-COV-2 RNA RESP QL NAA+PROBE: NOT DETECTED

## 2020-11-24 ENCOUNTER — HOSPITAL ENCOUNTER (OUTPATIENT)
Age: 45
Discharge: HOME | End: 2020-11-24
Attending: EMERGENCY MEDICINE
Payer: MEDICAID

## 2020-11-24 VITALS
DIASTOLIC BLOOD PRESSURE: 69 MMHG | BODY MASS INDEX: 22.2 KG/M2 | WEIGHT: 130 LBS | HEART RATE: 77 BPM | SYSTOLIC BLOOD PRESSURE: 111 MMHG | HEIGHT: 64 IN | OXYGEN SATURATION: 100 % | RESPIRATION RATE: 14 BRPM | TEMPERATURE: 98 F

## 2020-11-24 DIAGNOSIS — Z20.822 ENCOUNTER FOR LABORATORY TESTING FOR COVID-19 VIRUS: Primary | ICD-10-CM

## 2020-11-24 PROCEDURE — 99213 OFFICE O/P EST LOW 20 MIN: CPT | Performed by: EMERGENCY MEDICINE

## 2020-11-24 NOTE — ED PROVIDER NOTES
Patient Seen in: [facility] [place]      History   Patient presents with:  Diarrhea    Stated Complaint: Wisam Rachid    HPI    Mom is a 43-year-old female with a history of anxiety who presents to immediate care for Covid testing.   She has been ha Physical Exam  Vitals signs reviewed.    HENT:      Right Ear: Tympanic membrane normal.      Left Ear: Tympanic membrane normal.      Nose: Nose normal.      Mouth/Throat:      Mouth: Mucous membranes are moist.   Eyes:      Extraocular Movements:

## 2021-01-08 ENCOUNTER — HOSPITAL ENCOUNTER (OUTPATIENT)
Age: 46
Discharge: HOME | End: 2021-01-08
Payer: MEDICAID

## 2021-01-08 VITALS
DIASTOLIC BLOOD PRESSURE: 78 MMHG | RESPIRATION RATE: 20 BRPM | OXYGEN SATURATION: 97 % | HEART RATE: 84 BPM | SYSTOLIC BLOOD PRESSURE: 115 MMHG | TEMPERATURE: 99 F

## 2021-01-08 DIAGNOSIS — R68.83 CHILLS: ICD-10-CM

## 2021-01-08 DIAGNOSIS — Z20.822 CLOSE EXPOSURE TO COVID-19 VIRUS: Primary | ICD-10-CM

## 2021-01-08 PROCEDURE — 99203 OFFICE O/P NEW LOW 30 MIN: CPT | Performed by: PHYSICIAN ASSISTANT

## 2021-01-08 NOTE — ED INITIAL ASSESSMENT (HPI)
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 She reports chills, diarrhea and symptoms of possible covid.

## 2021-01-08 NOTE — ED PROVIDER NOTES
Patient Seen in: [facility] [place]      History   Patient presents with:  Testing    Stated Complaint: covid test    HPI/Subjective:   HPI    31-year-old female here for Covid testing.   Patient reports chills, diarrhea and fatigue for the last 2 to Skin:     General: Skin is warm. Neurological:      General: No focal deficit present. Mental Status: She is alert and oriented to person, place, and time.    Psychiatric:         Mood and Affect: Mood normal.         Behavior: Behavior normal.

## 2021-01-09 LAB — SARS-COV-2 RNA,QUAL, RT-PCR: NOT DETECTED

## 2021-08-27 ENCOUNTER — HOSPITAL ENCOUNTER (OUTPATIENT)
Age: 46
Discharge: HOME | End: 2021-08-27
Payer: MEDICAID

## 2021-08-27 VITALS
OXYGEN SATURATION: 100 % | WEIGHT: 140 LBS | DIASTOLIC BLOOD PRESSURE: 78 MMHG | TEMPERATURE: 97 F | RESPIRATION RATE: 16 BRPM | HEART RATE: 81 BPM | BODY MASS INDEX: 23.9 KG/M2 | HEIGHT: 64 IN | SYSTOLIC BLOOD PRESSURE: 125 MMHG

## 2021-08-27 DIAGNOSIS — S61.212A LACERATION OF RIGHT MIDDLE FINGER WITHOUT FOREIGN BODY WITHOUT DAMAGE TO NAIL, INITIAL ENCOUNTER: Primary | ICD-10-CM

## 2021-08-27 PROCEDURE — 90715 TDAP VACCINE 7 YRS/> IM: CPT | Performed by: NURSE PRACTITIONER

## 2021-08-27 PROCEDURE — 99213 OFFICE O/P EST LOW 20 MIN: CPT | Performed by: NURSE PRACTITIONER

## 2021-08-27 PROCEDURE — 90471 IMMUNIZATION ADMIN: CPT | Performed by: NURSE PRACTITIONER

## 2021-08-27 PROCEDURE — 12001 RPR S/N/AX/GEN/TRNK 2.5CM/<: CPT | Performed by: NURSE PRACTITIONER

## 2021-08-27 NOTE — ED PROVIDER NOTES
Patient Seen in: [facility] [place]      History   Patient presents with:  Laceration/Abrasion    Stated Complaint: Cut finger    HPI/Subjective:   HPI    This is a well appearing 40year old who presents with a chief complaint of 3rd digit finger la Nose normal.      Mouth/Throat:      Mouth: Mucous membranes are moist.      Pharynx: Oropharynx is clear. Uvula midline. Eyes:      General: Lids are normal.      Extraocular Movements: Extraocular movements intact.       Conjunctiva/sclera: Conjunctivae clinical labs tests, EKG's, and medication. I Updated patient  on all findings, who verbalized understanding and agreement with the plan.      I explained to the patient that emergent conditions may arise and to go to the ER for new, worsening or any

## 2021-09-21 ENCOUNTER — HOSPITAL ENCOUNTER (OUTPATIENT)
Age: 46
Discharge: HOME | End: 2021-09-21
Payer: MEDICAID

## 2021-09-21 VITALS
HEART RATE: 72 BPM | WEIGHT: 129 LBS | SYSTOLIC BLOOD PRESSURE: 119 MMHG | BODY MASS INDEX: 22.02 KG/M2 | RESPIRATION RATE: 18 BRPM | OXYGEN SATURATION: 100 % | HEIGHT: 64 IN | DIASTOLIC BLOOD PRESSURE: 74 MMHG | TEMPERATURE: 98 F

## 2021-09-21 DIAGNOSIS — R23.8 SKIN IRRITATION: Primary | ICD-10-CM

## 2021-09-21 PROCEDURE — 99213 OFFICE O/P EST LOW 20 MIN: CPT | Performed by: PHYSICIAN ASSISTANT

## 2021-09-21 NOTE — ED PROVIDER NOTES
Patient Seen in: [facility] [place]      History   Patient presents with:  Skin Problem    Stated Complaint: check bug bite? Subjective:   HPI    39 yo female here for rash to the left upper extremity.  Patient reports she exited a hot yoga class y Mucous membranes are moist.   Eyes:      Extraocular Movements: Extraocular movements intact. Pupils: Pupils are equal, round, and reactive to light. Cardiovascular:      Rate and Rhythm: Normal rate.    Pulmonary:      Effort: Pulmonary effort is no

## 2022-03-25 ENCOUNTER — HOSPITAL ENCOUNTER (OUTPATIENT)
Age: 47
Discharge: HOME | End: 2022-03-25
Payer: MEDICAID

## 2022-03-25 ENCOUNTER — APPOINTMENT (OUTPATIENT)
Dept: CT IMAGING | Age: 47
End: 2022-03-25
Attending: NURSE PRACTITIONER
Payer: MEDICAID

## 2022-03-25 VITALS
TEMPERATURE: 98 F | HEART RATE: 71 BPM | DIASTOLIC BLOOD PRESSURE: 71 MMHG | RESPIRATION RATE: 20 BRPM | SYSTOLIC BLOOD PRESSURE: 121 MMHG | OXYGEN SATURATION: 100 %

## 2022-03-25 DIAGNOSIS — M54.89 BACK PAIN WITHOUT SCIATICA: Primary | ICD-10-CM

## 2022-03-25 DIAGNOSIS — N83.202 LEFT OVARIAN CYST: ICD-10-CM

## 2022-03-25 LAB
#MXD IC: 0.4 X10ˆ3/UL (ref 0.1–1)
B-HCG UR QL: NEGATIVE
BILIRUB UR QL STRIP: NEGATIVE
BUN BLD-MCNC: 13 MG/DL (ref 7–18)
CHLORIDE BLD-SCNC: 104 MMOL/L (ref 98–112)
CLARITY UR: CLEAR
CO2 BLD-SCNC: 25 MMOL/L (ref 21–32)
COLOR UR: YELLOW
CREAT BLD-MCNC: 0.7 MG/DL
GLUCOSE BLD-MCNC: 96 MG/DL (ref 70–99)
GLUCOSE UR STRIP-MCNC: NEGATIVE MG/DL
HCT VFR BLD AUTO: 40.9 %
HCT VFR BLD CALC: 43 %
HGB BLD-MCNC: 13.3 G/DL
ISTAT IONIZED CALCIUM FOR CHEM 8: 1.21 MMOL/L (ref 1.12–1.32)
LYMPHOCYTES # BLD AUTO: 1.4 X10ˆ3/UL (ref 1–4)
LYMPHOCYTES NFR BLD AUTO: 23.3 %
MCH RBC QN AUTO: 29 PG (ref 26–34)
MCHC RBC AUTO-ENTMCNC: 32.5 G/DL (ref 31–37)
MCV RBC AUTO: 89.3 FL (ref 80–100)
MIXED CELL %: 7.1 %
NEUTROPHILS # BLD AUTO: 4.4 X10ˆ3/UL (ref 1.5–7.7)
NEUTROPHILS NFR BLD AUTO: 69.6 %
NITRITE UR QL STRIP: NEGATIVE
PH UR STRIP: 6.5 [PH]
PLATELET # BLD AUTO: 243 X10ˆ3/UL (ref 150–450)
POTASSIUM BLD-SCNC: 4 MMOL/L (ref 3.6–5.1)
PROT UR STRIP-MCNC: NEGATIVE MG/DL
RBC # BLD AUTO: 4.58 X10ˆ6/UL
SODIUM BLD-SCNC: 141 MMOL/L (ref 136–145)
SP GR UR STRIP: 1.02
UROBILINOGEN UR STRIP-ACNC: <2 MG/DL
WBC # BLD AUTO: 6.2 X10ˆ3/UL (ref 4–11)

## 2022-03-25 PROCEDURE — 81025 URINE PREGNANCY TEST: CPT | Performed by: NURSE PRACTITIONER

## 2022-03-25 PROCEDURE — 85025 COMPLETE CBC W/AUTO DIFF WBC: CPT | Performed by: NURSE PRACTITIONER

## 2022-03-25 PROCEDURE — 36415 COLL VENOUS BLD VENIPUNCTURE: CPT | Performed by: NURSE PRACTITIONER

## 2022-03-25 PROCEDURE — 99214 OFFICE O/P EST MOD 30 MIN: CPT | Performed by: NURSE PRACTITIONER

## 2022-03-25 PROCEDURE — 74176 CT ABD & PELVIS W/O CONTRAST: CPT | Performed by: NURSE PRACTITIONER

## 2022-03-25 PROCEDURE — 81002 URINALYSIS NONAUTO W/O SCOPE: CPT | Performed by: NURSE PRACTITIONER

## 2022-03-25 PROCEDURE — 80047 BASIC METABLC PNL IONIZED CA: CPT | Performed by: NURSE PRACTITIONER

## 2022-03-25 RX ORDER — CYCLOBENZAPRINE HCL 10 MG
10 TABLET ORAL 3 TIMES DAILY PRN
Qty: 20 TABLET | Refills: 0 | Status: SHIPPED | OUTPATIENT
Start: 2022-03-25 | End: 2022-04-01

## 2022-03-25 RX ORDER — IBUPROFEN 600 MG/1
600 TABLET ORAL ONCE
Status: COMPLETED | OUTPATIENT
Start: 2022-03-25 | End: 2022-03-25

## 2022-03-25 RX ORDER — PREDNISONE 20 MG/1
40 TABLET ORAL DAILY
Qty: 10 TABLET | Refills: 0 | Status: SHIPPED | OUTPATIENT
Start: 2022-03-25 | End: 2022-03-30

## 2022-03-25 NOTE — ED INITIAL ASSESSMENT (HPI)
Pt came in due to lower back pain for the past 3 days. Pt denies any injuries or trauma. Pt denies any urinary symptoms. Pt has easy non labored respirations. Pt stated she has history of kidney stones.

## 2022-07-01 ENCOUNTER — HOSPITAL ENCOUNTER (OUTPATIENT)
Age: 47
Discharge: HOME | End: 2022-07-01
Payer: MEDICAID

## 2022-07-01 VITALS
TEMPERATURE: 98 F | DIASTOLIC BLOOD PRESSURE: 73 MMHG | RESPIRATION RATE: 16 BRPM | OXYGEN SATURATION: 100 % | HEART RATE: 79 BPM | SYSTOLIC BLOOD PRESSURE: 129 MMHG

## 2022-07-01 DIAGNOSIS — R30.0 DYSURIA: ICD-10-CM

## 2022-07-01 DIAGNOSIS — R35.0 URINARY FREQUENCY: ICD-10-CM

## 2022-07-01 DIAGNOSIS — N30.00 ACUTE CYSTITIS WITHOUT HEMATURIA: Primary | ICD-10-CM

## 2022-07-01 LAB
BILIRUB UR QL STRIP: NEGATIVE
COLOR UR: YELLOW
GLUCOSE UR STRIP-MCNC: NEGATIVE MG/DL
KETONES UR STRIP-MCNC: NEGATIVE MG/DL
LEUKOCYTE ESTERASE UR QL STRIP: NEGATIVE
NITRITE UR QL STRIP: NEGATIVE
PH UR STRIP: 6.5 [PH]
PROT UR STRIP-MCNC: NEGATIVE MG/DL
SP GR UR STRIP: 1.02
UROBILINOGEN UR STRIP-ACNC: <2 MG/DL

## 2022-07-01 PROCEDURE — 81002 URINALYSIS NONAUTO W/O SCOPE: CPT | Performed by: EMERGENCY MEDICINE

## 2022-07-01 PROCEDURE — 99213 OFFICE O/P EST LOW 20 MIN: CPT | Performed by: EMERGENCY MEDICINE

## 2022-07-01 RX ORDER — IBUPROFEN 600 MG/1
600 TABLET ORAL ONCE
Status: COMPLETED | OUTPATIENT
Start: 2022-07-01 | End: 2022-07-01

## 2022-07-01 RX ORDER — NITROFURANTOIN 25; 75 MG/1; MG/1
100 CAPSULE ORAL 2 TIMES DAILY
Qty: 14 CAPSULE | Refills: 0 | Status: SHIPPED | OUTPATIENT
Start: 2022-07-01 | End: 2022-07-08

## 2022-07-01 RX ORDER — PHENAZOPYRIDINE HYDROCHLORIDE 200 MG/1
200 TABLET, FILM COATED ORAL 3 TIMES DAILY PRN
Qty: 6 TABLET | Refills: 0 | Status: SHIPPED | OUTPATIENT
Start: 2022-07-01 | End: 2022-07-08

## 2022-07-06 RX ORDER — SULFAMETHOXAZOLE AND TRIMETHOPRIM 800; 160 MG/1; MG/1
1 TABLET ORAL 2 TIMES DAILY
Qty: 14 TABLET | Refills: 0 | Status: SHIPPED | OUTPATIENT
Start: 2022-07-06 | End: 2022-07-13

## 2022-10-04 ENCOUNTER — APPOINTMENT (OUTPATIENT)
Dept: GENERAL RADIOLOGY | Age: 47
End: 2022-10-04
Attending: NURSE PRACTITIONER
Payer: MEDICAID

## 2022-10-04 ENCOUNTER — HOSPITAL ENCOUNTER (OUTPATIENT)
Age: 47
Discharge: HOME | End: 2022-10-04
Payer: MEDICAID

## 2022-10-04 VITALS
RESPIRATION RATE: 22 BRPM | TEMPERATURE: 98 F | HEART RATE: 71 BPM | SYSTOLIC BLOOD PRESSURE: 126 MMHG | OXYGEN SATURATION: 100 % | DIASTOLIC BLOOD PRESSURE: 76 MMHG

## 2022-10-04 DIAGNOSIS — S69.92XA INJURY OF LEFT HAND, INITIAL ENCOUNTER: Primary | ICD-10-CM

## 2022-10-04 DIAGNOSIS — M79.642 PAIN OF LEFT HAND: ICD-10-CM

## 2022-10-04 PROCEDURE — L3924 HFO WITHOUT JOINTS PRE OTS: HCPCS | Performed by: NURSE PRACTITIONER

## 2022-10-04 PROCEDURE — 99213 OFFICE O/P EST LOW 20 MIN: CPT | Performed by: NURSE PRACTITIONER

## 2022-10-04 PROCEDURE — 73130 X-RAY EXAM OF HAND: CPT | Performed by: NURSE PRACTITIONER

## 2022-10-04 NOTE — ED INITIAL ASSESSMENT (HPI)
Patient presents a&o 4/4 c/o L hand pain after hitting garage door with stool, attempting to throw it into dumpster. Occurred at ~1145am today. Took naproxen PTA Sensation and circulation intact.  Pt guarding with pain

## 2022-11-16 ENCOUNTER — HOSPITAL ENCOUNTER (OUTPATIENT)
Age: 47
Discharge: HOME | End: 2022-11-16
Payer: MEDICAID

## 2022-11-16 ENCOUNTER — APPOINTMENT (OUTPATIENT)
Dept: GENERAL RADIOLOGY | Age: 47
End: 2022-11-16
Attending: NURSE PRACTITIONER
Payer: MEDICAID

## 2022-11-16 VITALS
HEART RATE: 68 BPM | DIASTOLIC BLOOD PRESSURE: 78 MMHG | RESPIRATION RATE: 20 BRPM | TEMPERATURE: 99 F | OXYGEN SATURATION: 98 % | SYSTOLIC BLOOD PRESSURE: 121 MMHG

## 2022-11-16 DIAGNOSIS — Z20.822 ENCOUNTER FOR LABORATORY TESTING FOR COVID-19 VIRUS: ICD-10-CM

## 2022-11-16 DIAGNOSIS — J20.8 VIRAL BRONCHITIS: ICD-10-CM

## 2022-11-16 DIAGNOSIS — R05.9 COUGH: Primary | ICD-10-CM

## 2022-11-16 LAB
POCT INFLUENZA A: NEGATIVE
POCT INFLUENZA B: NEGATIVE
SARS-COV-2 RNA RESP QL NAA+PROBE: NOT DETECTED

## 2022-11-16 PROCEDURE — 87502 INFLUENZA DNA AMP PROBE: CPT | Performed by: NURSE PRACTITIONER

## 2022-11-16 PROCEDURE — 71046 X-RAY EXAM CHEST 2 VIEWS: CPT | Performed by: NURSE PRACTITIONER

## 2022-11-16 PROCEDURE — U0002 COVID-19 LAB TEST NON-CDC: HCPCS | Performed by: NURSE PRACTITIONER

## 2022-11-16 PROCEDURE — 99213 OFFICE O/P EST LOW 20 MIN: CPT | Performed by: NURSE PRACTITIONER

## 2022-11-16 RX ORDER — INHALER, ASSIST DEVICES
SPACER (EA) MISCELLANEOUS
Qty: 1 EACH | Refills: 0 | Status: SHIPPED | OUTPATIENT
Start: 2022-11-16

## 2022-11-16 RX ORDER — CODEINE PHOSPHATE AND GUAIFENESIN 10; 100 MG/5ML; MG/5ML
5 SOLUTION ORAL EVERY 6 HOURS PRN
Qty: 120 ML | Refills: 0 | Status: SHIPPED | OUTPATIENT
Start: 2022-11-16

## 2022-11-16 RX ORDER — PREDNISONE 20 MG/1
40 TABLET ORAL DAILY
Qty: 10 TABLET | Refills: 0 | Status: SHIPPED | OUTPATIENT
Start: 2022-11-16 | End: 2022-11-21

## 2022-11-16 RX ORDER — ALBUTEROL SULFATE 90 UG/1
2 AEROSOL, METERED RESPIRATORY (INHALATION) EVERY 4 HOURS PRN
Qty: 1 EACH | Refills: 0 | Status: SHIPPED | OUTPATIENT
Start: 2022-11-16 | End: 2022-12-16

## 2022-11-16 NOTE — ED INITIAL ASSESSMENT (HPI)
Pt c/o cough, coughing fits that cause emesis for ~ 13 days. Pt had fever and chills. Denies fever today.

## 2022-11-16 NOTE — DISCHARGE INSTRUCTIONS
No pneumonia on the x-ray. Take the steroid daily. Use the inhaler as needed for coughing spasms. Use the codeine cough medicine at night. Continue Tessalon or Delsym over-the-counter. Hot tea with honey. Push fluids. Humidifier in the room.   Follow-up with your primary doctor if no improvement

## 2022-11-17 ENCOUNTER — OFFICE VISIT (OUTPATIENT)
Dept: SURGERY | Facility: CLINIC | Age: 47
End: 2022-11-17
Payer: MEDICAID

## 2022-11-17 DIAGNOSIS — M18.11 PRIMARY OSTEOARTHRITIS OF FIRST CARPOMETACARPAL JOINT OF RIGHT HAND: Primary | ICD-10-CM

## 2022-11-17 DIAGNOSIS — M62.81 DISTAL MUSCLE WEAKNESS: Primary | ICD-10-CM

## 2022-11-17 DIAGNOSIS — M25.642 STIFFNESS OF JOINT, HAND, LEFT: ICD-10-CM

## 2022-11-17 DIAGNOSIS — M79.641 PAIN IN RIGHT HAND: ICD-10-CM

## 2022-11-17 RX ORDER — ALBUTEROL SULFATE 90 UG/1
2 AEROSOL, METERED RESPIRATORY (INHALATION) EVERY 4 HOURS PRN
COMMUNITY
Start: 2022-08-15 | End: 2022-11-17

## 2022-11-21 NOTE — PROGRESS NOTES
Subjective: I hurt my left thumb. Objective:     Current level of performance:  ADL: Independent  Work: N/A  Leisure: Not addressed    Measurements/Tests:  ROM:         N/A         Treatment Provided this day: Fabricated a left thumb spica splint per order. Treatment Time: 30 minutes      Summary/Analysis of Treatment session: Tolerated the fabrication of a left thumb spica splint well. Plan: To be compliant with the wear and care of left thumb spica splint, x 4 weeks. Follow up in:  X 3 weeks.           Magdalene DICKSON/L

## 2022-12-08 ENCOUNTER — OFFICE VISIT (OUTPATIENT)
Dept: SURGERY | Facility: CLINIC | Age: 47
End: 2022-12-08
Payer: MEDICAID

## 2022-12-08 DIAGNOSIS — M62.81 DISTAL MUSCLE WEAKNESS: Primary | ICD-10-CM

## 2022-12-08 DIAGNOSIS — M25.641 JOINT STIFFNESS OF HAND, RIGHT: ICD-10-CM

## 2022-12-08 PROCEDURE — 97110 THERAPEUTIC EXERCISES: CPT | Performed by: OCCUPATIONAL THERAPIST

## 2022-12-08 PROCEDURE — 97166 OT EVAL MOD COMPLEX 45 MIN: CPT | Performed by: OCCUPATIONAL THERAPIST

## 2022-12-08 NOTE — PROGRESS NOTES
OCCUPATIONAL THERAPY EVALUATION:   Steve Rizzo   [id number]       SUBJECTIVE:    HX of Injury: Left Thumb RCL Sprain seen x 44 days post injury. Chief Complaint:   Left thumb discomfort. Precautions: None  Premorbid Functional Status: Independent w/ driving / sitting, Independent w/ ADL's  Current Level of Function: As noted above. Employment: Unemployed  Hand Dominance: right  Living Situation: Family  Barriers to Learning: None  Patient Goals: Full use of the left hand. Imaging/Tests: X-ray        OBJECTIVE DATA:   PAIN:   Rating (1/10): 1/10 at rest, 2/10 with activity  Location:     OBSERVATION:   Guarding movements    ORTHOTICS:  Left Thumb spica splint    SCAR/INCISION: Not applicabable. SENSORY:  Intact    AROM/PROM:  (Degrees)  LEFT HAND:    Thumb IF MF RF SF   MP 40       PIP 30       DIP        STERN 70 degrees of STERN                 STRENGTH: (lbs) Right Average Left Average   : NT NT   2 pt Pinch:     3 pt Pinch:     Lateral Pinch:         ASSESSMENT & PLAN OF CARE:    Treatment Provided: Patient was seen for an initial evaluation, hand washing :  HEP:  AROM, Tendon glides, x 20 reps per set, x 5 sets daily. Reviewed hand elevation importance. Written handout was provided to reinforce today's treatment and educational session. Rehabilitation Potential: Good    CLINICAL ASSESSMENT:    Patient/Caregiver Education Provided: Yes    Treatment Plan:  Therapeutic Exercise  Therapeutic Activities  Modalities  Patient/Family Education  Splinting: Left Thumb Spica splint. GOALS:  Short term goals to be reached in x 2 weeks:    1) Independent with HEP. Kesha Led 2) Increased MP AROM x 15 - 20 degrees . Increased PIP AROM  X 15 - 20 degrees. Long term goals to be reached in x 2 - 3 weeks:    1) Full functional use of the involved extremity for self-care, leisure and work related tasks:  . Patient will be seen 2 x /week for 2 weeks or a total of 4 visits.    Pt. was advised regarding the findings of this evaluation and agrees to the plan of care. Gillian Felix     I have reviewed the treatment plan and concur.    Nai Barnett MD

## 2022-12-15 ENCOUNTER — OFFICE VISIT (OUTPATIENT)
Dept: SURGERY | Facility: CLINIC | Age: 47
End: 2022-12-15
Payer: MEDICAID

## 2022-12-15 DIAGNOSIS — M25.641 JOINT STIFFNESS OF HAND, RIGHT: ICD-10-CM

## 2022-12-15 DIAGNOSIS — M62.81 DISTAL MUSCLE WEAKNESS: Primary | ICD-10-CM

## 2022-12-15 DIAGNOSIS — S63.642A SPRAIN OF METACARPOPHALANGEAL (MCP) JOINT OF LEFT THUMB, INITIAL ENCOUNTER: Primary | ICD-10-CM

## 2022-12-15 PROCEDURE — 97110 THERAPEUTIC EXERCISES: CPT | Performed by: OCCUPATIONAL THERAPIST

## 2022-12-15 PROCEDURE — 99213 OFFICE O/P EST LOW 20 MIN: CPT | Performed by: PLASTIC SURGERY

## 2022-12-15 NOTE — PROGRESS NOTES
Subjective: I am a little sore. 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 Treatment Time: 20 minutes      Summary/Analysis of Treatment session: Patient has been compliant with the wear and care of left thumb spica splint, x 3 weeks. Plan: Full use of the right hand in x 2 - 3 weeks.       Follow up in:  X 1 week          Tunde Costa OTR/L

## 2023-01-05 ENCOUNTER — OFFICE VISIT (OUTPATIENT)
Dept: SURGERY | Facility: CLINIC | Age: 48
End: 2023-01-05
Payer: MEDICAID

## 2023-01-05 DIAGNOSIS — S63.642A SPRAIN OF METACARPOPHALANGEAL (MCP) JOINT OF LEFT THUMB, INITIAL ENCOUNTER: Primary | ICD-10-CM

## 2023-01-05 DIAGNOSIS — M62.81 DISTAL MUSCLE WEAKNESS: Primary | ICD-10-CM

## 2023-01-05 DIAGNOSIS — M25.642 STIFFNESS OF JOINT, HAND, LEFT: ICD-10-CM

## 2023-01-05 PROCEDURE — 99213 OFFICE O/P EST LOW 20 MIN: CPT | Performed by: PLASTIC SURGERY

## 2023-01-05 PROCEDURE — 97110 THERAPEUTIC EXERCISES: CPT | Performed by: OCCUPATIONAL THERAPIST

## 2023-01-05 NOTE — PROGRESS NOTES
Subjective: I have some nerve discomfort of the right thumb, radial border. Objective:     Current level of performance:  ADL: Independent  Work: N/A  Leisure: Family    Measurements/Tests:  ROM:  Testing By: yoel   Strength Right: 70 #      Strength Left: 65 #      Treatment Provided this day: Up dated right hand objective information: Physician follow-up. Treatment Time: 20 minutes      Summary/Analysis of Treatment session: No further OT needs at this time:      Plan: Discontinue OT. Follow up in: To call with questions and or concerns. Siria Adams  OTR/L      I have reviewed the treatment plan and concur.    Tammy Ambriz MD

## 2023-03-16 ENCOUNTER — HOSPITAL ENCOUNTER (OUTPATIENT)
Age: 48
Discharge: HOME | End: 2023-03-16
Payer: MEDICAID

## 2023-03-16 VITALS
SYSTOLIC BLOOD PRESSURE: 110 MMHG | TEMPERATURE: 97 F | HEART RATE: 64 BPM | OXYGEN SATURATION: 99 % | DIASTOLIC BLOOD PRESSURE: 64 MMHG | RESPIRATION RATE: 16 BRPM

## 2023-03-16 DIAGNOSIS — J02.9 SORE THROAT: Primary | ICD-10-CM

## 2023-03-16 LAB — S PYO AG THROAT QL: NEGATIVE

## 2023-03-16 RX ORDER — CETIRIZINE HYDROCHLORIDE 10 MG/1
10 TABLET ORAL DAILY
Qty: 30 TABLET | Refills: 0 | Status: SHIPPED | OUTPATIENT
Start: 2023-03-16 | End: 2023-04-15

## 2023-06-24 ENCOUNTER — HOSPITAL ENCOUNTER (OUTPATIENT)
Age: 48
Discharge: HOME | End: 2023-06-24
Payer: MEDICAID

## 2023-06-24 VITALS
HEART RATE: 67 BPM | OXYGEN SATURATION: 98 % | DIASTOLIC BLOOD PRESSURE: 81 MMHG | RESPIRATION RATE: 16 BRPM | SYSTOLIC BLOOD PRESSURE: 120 MMHG | TEMPERATURE: 98 F

## 2023-06-24 DIAGNOSIS — B02.9 HERPES ZOSTER WITHOUT COMPLICATION: Primary | ICD-10-CM

## 2023-06-24 DIAGNOSIS — M43.6 TORTICOLLIS: ICD-10-CM

## 2023-06-24 PROCEDURE — 99213 OFFICE O/P EST LOW 20 MIN: CPT | Performed by: NURSE PRACTITIONER

## 2023-06-24 RX ORDER — VALACYCLOVIR HYDROCHLORIDE 1 G/1
1000 TABLET, FILM COATED ORAL 3 TIMES DAILY
Qty: 21 TABLET | Refills: 0 | Status: SHIPPED | OUTPATIENT
Start: 2023-06-24 | End: 2023-07-01

## 2023-06-24 NOTE — ED INITIAL ASSESSMENT (HPI)
Pt with rash to R ribs x2 days. Pt reports pain and itching to rash. Pt also reports stiffness to R side of neck x30 min PTA. Pt denies fevers.

## 2023-06-24 NOTE — DISCHARGE INSTRUCTIONS
Please take medication as prescribed. you may also take Tylenol or ibuprofen for pain.   Close follow-up with your primary care provider is recommended

## 2023-07-20 ENCOUNTER — HOSPITAL ENCOUNTER (OUTPATIENT)
Age: 48
Discharge: HOME | End: 2023-07-20
Payer: MEDICAID

## 2023-07-20 VITALS
RESPIRATION RATE: 18 BRPM | HEART RATE: 75 BPM | SYSTOLIC BLOOD PRESSURE: 118 MMHG | TEMPERATURE: 98 F | DIASTOLIC BLOOD PRESSURE: 82 MMHG | OXYGEN SATURATION: 100 %

## 2023-07-20 DIAGNOSIS — S61.209A AVULSION OF FINGER, INITIAL ENCOUNTER: Primary | ICD-10-CM

## 2023-07-20 PROCEDURE — 99213 OFFICE O/P EST LOW 20 MIN: CPT | Performed by: NURSE PRACTITIONER

## 2023-07-20 PROCEDURE — 12001 RPR S/N/AX/GEN/TRNK 2.5CM/<: CPT | Performed by: NURSE PRACTITIONER

## 2023-07-20 RX ORDER — IBUPROFEN 600 MG/1
600 TABLET ORAL ONCE
Status: COMPLETED | OUTPATIENT
Start: 2023-07-20 | End: 2023-07-20

## 2023-07-20 NOTE — ED INITIAL ASSESSMENT (HPI)
Pt sliced tip of left thumb with a mandolin just prior to arrival. \"V\" shaped skin flap noted. Slight bleeding noted; pt holding pressure.  Wound cleaned with NS.

## 2023-07-20 NOTE — DISCHARGE INSTRUCTIONS
Do not get area wet for at least 24 hours. After that showering okay. No prolonged exposure to water. No tub baths or swimming. Take Tylenol before hours Motrin every 6 hours. Dermabond will dissolve on its own. Monitor for signs and symptoms of infection: Fevers, chills, redness, swelling. If the symptoms occur, please go to ER for further follow-up and management.

## 2023-10-20 PROBLEM — R91.8 LUNG NODULES: Status: ACTIVE | Noted: 2019-11-16

## 2023-10-20 PROBLEM — F13.99 SEDATIVE, HYPNOTIC OR ANXIOLYTIC USE, UNSPECIFIED WITH UNSPECIFIED SEDATIVE, HYPNOTIC OR ANXIOLYTIC-INDUCED DISORDER (HCC): Status: ACTIVE | Noted: 2022-10-26

## 2023-10-20 PROBLEM — R58 ECCHYMOSIS: Status: ACTIVE | Noted: 2019-11-16

## 2023-10-20 PROBLEM — O36.80X0 PREGNANCY OF UNKNOWN ANATOMIC LOCATION (HCC): Status: ACTIVE | Noted: 2019-01-08

## 2023-10-20 PROBLEM — O36.80X0 PREGNANCY OF UNKNOWN ANATOMIC LOCATION: Status: ACTIVE | Noted: 2019-01-08

## 2023-10-20 RX ORDER — FLUTICASONE PROPIONATE 50 MCG
2 SPRAY, SUSPENSION (ML) NASAL DAILY
COMMUNITY
Start: 2023-06-26 | End: 2023-10-23

## 2023-10-23 ENCOUNTER — LAB ENCOUNTER (OUTPATIENT)
Dept: LAB | Age: 48
End: 2023-10-23
Attending: STUDENT IN AN ORGANIZED HEALTH CARE EDUCATION/TRAINING PROGRAM

## 2023-10-23 ENCOUNTER — OFFICE VISIT (OUTPATIENT)
Dept: FAMILY MEDICINE CLINIC | Facility: CLINIC | Age: 48
End: 2023-10-23

## 2023-10-23 VITALS
TEMPERATURE: 98 F | DIASTOLIC BLOOD PRESSURE: 72 MMHG | SYSTOLIC BLOOD PRESSURE: 112 MMHG | HEIGHT: 64 IN | OXYGEN SATURATION: 99 % | BODY MASS INDEX: 27.45 KG/M2 | HEART RATE: 73 BPM | WEIGHT: 160.81 LBS

## 2023-10-23 DIAGNOSIS — Z63.4 BEREAVEMENT: ICD-10-CM

## 2023-10-23 DIAGNOSIS — Z01.419 ENCOUNTER FOR WELL WOMAN EXAM WITH ROUTINE GYNECOLOGICAL EXAM: Primary | ICD-10-CM

## 2023-10-23 DIAGNOSIS — Z77.22 HISTORY OF SECOND HAND SMOKE EXPOSURE: ICD-10-CM

## 2023-10-23 DIAGNOSIS — R91.1 INCIDENTAL LUNG NODULE, LESS THAN OR EQUAL TO 3MM: ICD-10-CM

## 2023-10-23 DIAGNOSIS — Z01.419 ENCOUNTER FOR WELL WOMAN EXAM WITH ROUTINE GYNECOLOGICAL EXAM: ICD-10-CM

## 2023-10-23 DIAGNOSIS — J30.9 ALLERGIC RHINITIS, UNSPECIFIED SEASONALITY, UNSPECIFIED TRIGGER: ICD-10-CM

## 2023-10-23 DIAGNOSIS — M21.611 BUNION OF GREAT TOE OF RIGHT FOOT: ICD-10-CM

## 2023-10-23 DIAGNOSIS — Z12.4 SCREENING FOR CERVICAL CANCER: ICD-10-CM

## 2023-10-23 DIAGNOSIS — F41.9 ANXIETY: ICD-10-CM

## 2023-10-23 DIAGNOSIS — Z12.31 ENCOUNTER FOR SCREENING MAMMOGRAM FOR MALIGNANT NEOPLASM OF BREAST: ICD-10-CM

## 2023-10-23 DIAGNOSIS — Z12.11 SCREEN FOR COLON CANCER: ICD-10-CM

## 2023-10-23 DIAGNOSIS — M79.641 RIGHT HAND PAIN: ICD-10-CM

## 2023-10-23 LAB
ALBUMIN SERPL-MCNC: 4 G/DL (ref 3.4–5)
ALBUMIN/GLOB SERPL: 1.2 {RATIO} (ref 1–2)
ALP LIVER SERPL-CCNC: 53 U/L
ALT SERPL-CCNC: 16 U/L
ANION GAP SERPL CALC-SCNC: 7 MMOL/L (ref 0–18)
AST SERPL-CCNC: 12 U/L (ref 15–37)
BILIRUB SERPL-MCNC: 0.4 MG/DL (ref 0.1–2)
BUN BLD-MCNC: 9 MG/DL (ref 7–18)
BUN/CREAT SERPL: 11.8 (ref 10–20)
CALCIUM BLD-MCNC: 9 MG/DL (ref 8.5–10.1)
CHLORIDE SERPL-SCNC: 109 MMOL/L (ref 98–112)
CHOLEST SERPL-MCNC: 163 MG/DL (ref ?–200)
CO2 SERPL-SCNC: 26 MMOL/L (ref 21–32)
CREAT BLD-MCNC: 0.76 MG/DL
DEPRECATED RDW RBC AUTO: 42.9 FL (ref 35.1–46.3)
EGFRCR SERPLBLD CKD-EPI 2021: 97 ML/MIN/1.73M2 (ref 60–?)
ERYTHROCYTE [DISTWIDTH] IN BLOOD BY AUTOMATED COUNT: 12.6 % (ref 11–15)
EST. AVERAGE GLUCOSE BLD GHB EST-MCNC: 103 MG/DL (ref 68–126)
FASTING PATIENT LIPID ANSWER: NO
FASTING STATUS PATIENT QL REPORTED: NO
GLOBULIN PLAS-MCNC: 3.3 G/DL (ref 2.8–4.4)
GLUCOSE BLD-MCNC: 87 MG/DL (ref 70–99)
HBA1C MFR BLD: 5.2 % (ref ?–5.7)
HCT VFR BLD AUTO: 40.1 %
HDLC SERPL-MCNC: 73 MG/DL (ref 40–59)
HGB BLD-MCNC: 13.1 G/DL
LDLC SERPL CALC-MCNC: 79 MG/DL (ref ?–100)
MCH RBC QN AUTO: 30.2 PG (ref 26–34)
MCHC RBC AUTO-ENTMCNC: 32.7 G/DL (ref 31–37)
MCV RBC AUTO: 92.4 FL
NONHDLC SERPL-MCNC: 90 MG/DL (ref ?–130)
OSMOLALITY SERPL CALC.SUM OF ELEC: 292 MOSM/KG (ref 275–295)
PLATELET # BLD AUTO: 258 10(3)UL (ref 150–450)
POTASSIUM SERPL-SCNC: 3.9 MMOL/L (ref 3.5–5.1)
PROT SERPL-MCNC: 7.3 G/DL (ref 6.4–8.2)
RBC # BLD AUTO: 4.34 X10(6)UL
SODIUM SERPL-SCNC: 142 MMOL/L (ref 136–145)
TRIGL SERPL-MCNC: 53 MG/DL (ref 30–149)
TSI SER-ACNC: 0.48 MIU/ML (ref 0.36–3.74)
VIT D+METAB SERPL-MCNC: 32.7 NG/ML (ref 30–100)
VLDLC SERPL CALC-MCNC: 8 MG/DL (ref 0–30)
WBC # BLD AUTO: 8.7 X10(3) UL (ref 4–11)

## 2023-10-23 PROCEDURE — 99214 OFFICE O/P EST MOD 30 MIN: CPT | Performed by: STUDENT IN AN ORGANIZED HEALTH CARE EDUCATION/TRAINING PROGRAM

## 2023-10-23 PROCEDURE — 80061 LIPID PANEL: CPT

## 2023-10-23 PROCEDURE — 99386 PREV VISIT NEW AGE 40-64: CPT | Performed by: STUDENT IN AN ORGANIZED HEALTH CARE EDUCATION/TRAINING PROGRAM

## 2023-10-23 PROCEDURE — 85027 COMPLETE CBC AUTOMATED: CPT

## 2023-10-23 PROCEDURE — 84443 ASSAY THYROID STIM HORMONE: CPT

## 2023-10-23 PROCEDURE — 3074F SYST BP LT 130 MM HG: CPT | Performed by: STUDENT IN AN ORGANIZED HEALTH CARE EDUCATION/TRAINING PROGRAM

## 2023-10-23 PROCEDURE — 83036 HEMOGLOBIN GLYCOSYLATED A1C: CPT

## 2023-10-23 PROCEDURE — 82306 VITAMIN D 25 HYDROXY: CPT

## 2023-10-23 PROCEDURE — 3008F BODY MASS INDEX DOCD: CPT | Performed by: STUDENT IN AN ORGANIZED HEALTH CARE EDUCATION/TRAINING PROGRAM

## 2023-10-23 PROCEDURE — 36415 COLL VENOUS BLD VENIPUNCTURE: CPT

## 2023-10-23 PROCEDURE — 80053 COMPREHEN METABOLIC PANEL: CPT

## 2023-10-23 PROCEDURE — 3078F DIAST BP <80 MM HG: CPT | Performed by: STUDENT IN AN ORGANIZED HEALTH CARE EDUCATION/TRAINING PROGRAM

## 2023-10-23 RX ORDER — MAGNESIUM OXIDE 400 MG/1
400 TABLET ORAL DAILY
Qty: 90 TABLET | Refills: 0 | Status: SHIPPED | OUTPATIENT
Start: 2023-10-23

## 2023-10-23 RX ORDER — CETIRIZINE HYDROCHLORIDE 10 MG/1
10 TABLET ORAL DAILY
Qty: 90 TABLET | Refills: 1 | Status: SHIPPED | OUTPATIENT
Start: 2023-10-23

## 2023-10-23 RX ORDER — FLUTICASONE PROPIONATE 50 MCG
2 SPRAY, SUSPENSION (ML) NASAL DAILY
Qty: 1 EACH | Refills: 3 | Status: SHIPPED | OUTPATIENT
Start: 2023-10-23

## 2023-10-23 RX ORDER — NAPROXEN 500 MG/1
500 TABLET ORAL 2 TIMES DAILY WITH MEALS
Qty: 180 TABLET | Refills: 3 | Status: SHIPPED | OUTPATIENT
Start: 2023-10-23

## 2023-10-23 SDOH — SOCIAL STABILITY - SOCIAL INSECURITY: DISSAPEARANCE AND DEATH OF FAMILY MEMBER: Z63.4

## 2023-10-24 LAB — HPV I/H RISK 1 DNA SPEC QL NAA+PROBE: NEGATIVE

## 2023-11-01 ENCOUNTER — HOSPITAL ENCOUNTER (OUTPATIENT)
Dept: CT IMAGING | Age: 48
Discharge: HOME | End: 2023-11-01
Attending: STUDENT IN AN ORGANIZED HEALTH CARE EDUCATION/TRAINING PROGRAM
Payer: MEDICAID

## 2023-11-01 DIAGNOSIS — R91.1 INCIDENTAL LUNG NODULE, LESS THAN OR EQUAL TO 3MM: ICD-10-CM

## 2023-11-01 DIAGNOSIS — Z77.22 HISTORY OF SECOND HAND SMOKE EXPOSURE: ICD-10-CM

## 2023-11-01 PROCEDURE — 71250 CT THORAX DX C-: CPT | Performed by: STUDENT IN AN ORGANIZED HEALTH CARE EDUCATION/TRAINING PROGRAM

## 2023-11-02 ENCOUNTER — TELEPHONE (OUTPATIENT)
Dept: FAMILY MEDICINE CLINIC | Facility: CLINIC | Age: 48
End: 2023-11-02

## 2023-11-02 NOTE — TELEPHONE ENCOUNTER
Pt requesting Dr to review CT of chest-stated she is \"Freaking Out\" with results   Especially knowing family Hx       CONCLUSION:   1. There are 2 closely adjacent micro nodules measuring 2 and 3 mm in the middle lobe. Recommend follow-up CT in 12 months to assess for changes. 2.  6 mm diameter soft tissue density focus in the upper pole cortex of the visualized left kidney. This could represent a hemorrhagic/proteinaceous cyst or solid renal mass. Recommend correlation with renal ultrasound to determine if this is a solid   or cystic process.

## 2023-11-07 ENCOUNTER — HOSPITAL ENCOUNTER (OUTPATIENT)
Dept: ULTRASOUND IMAGING | Facility: HOSPITAL | Age: 48
Discharge: HOME | End: 2023-11-07
Attending: STUDENT IN AN ORGANIZED HEALTH CARE EDUCATION/TRAINING PROGRAM
Payer: MEDICAID

## 2023-11-07 DIAGNOSIS — N28.89 LEFT RENAL MASS: ICD-10-CM

## 2023-11-07 PROCEDURE — 76775 US EXAM ABDO BACK WALL LIM: CPT | Performed by: STUDENT IN AN ORGANIZED HEALTH CARE EDUCATION/TRAINING PROGRAM

## 2024-01-12 DIAGNOSIS — F41.9 ANXIETY: ICD-10-CM

## 2024-01-13 NOTE — TELEPHONE ENCOUNTER
Please review. Protocol failed / No Protocol.    Requested Prescriptions   Pending Prescriptions Disp Refills    MAGNESIUM-OXIDE 400 (240 Mg) MG Oral Tab [Pharmacy Med Name: MAG-OXIDE 400MG TABLETS] 30 tablet 0     Sig: TAKE 1 TABLET(400 MG) BY MOUTH DAILY       There is no refill protocol information for this order

## 2024-01-14 RX ORDER — MELATONIN
1 DAILY
Qty: 30 TABLET | Refills: 0 | Status: SHIPPED | OUTPATIENT
Start: 2024-01-14

## 2024-01-15 DIAGNOSIS — F41.9 ANXIETY: ICD-10-CM

## 2024-01-16 RX ORDER — MELATONIN
1 DAILY
Qty: 90 TABLET | Refills: 0 | Status: SHIPPED | OUTPATIENT
Start: 2024-01-16

## 2024-01-16 NOTE — TELEPHONE ENCOUNTER
Please review; protocol failed/No Protocol  Requesting 90 day supply, is this appropriate?  Requested Prescriptions   Pending Prescriptions Disp Refills    MAGNESIUM-OXIDE 400 (240 Mg) MG Oral Tab [Pharmacy Med Name: MAG-OXIDE 400MG TABLETS] 90 tablet 0     Sig: TAKE 1 TABLET(400 MG) BY MOUTH DAILY       There is no refill protocol information for this order        Future Appointments         Provider Department Appt Notes    In 2 weeks ADO DEXA RM1; ADO ROBERT RM1 [facility] - [place]           Recent Outpatient Visits              4 weeks ago Anxiety    [facility] Austen Davison DO    Office Visit    2 months ago Encounter for well woman exam with routine gynecological exam    [facility] Yasmine Sacnhez MD    Office Visit    1 year ago Sprain of metacarpophalangeal (MCP) joint of left thumb, [facility]    Office Visit    1 year ago Distal muscle weakness    [facility]    Office Visit    1 year ago Distal muscle weakness    [facility]    Office Visit

## 2024-03-12 ENCOUNTER — OFFICE VISIT (OUTPATIENT)
Dept: PODIATRY CLINIC | Facility: CLINIC | Age: 49
End: 2024-03-12
Payer: MEDICAID

## 2024-03-12 ENCOUNTER — HOSPITAL ENCOUNTER (OUTPATIENT)
Dept: GENERAL RADIOLOGY | Age: 49
Discharge: HOME | End: 2024-03-12
Attending: PODIATRIST
Payer: MEDICAID

## 2024-03-12 DIAGNOSIS — M20.11 HAV (HALLUX ABDUCTO VALGUS), RIGHT: ICD-10-CM

## 2024-03-12 DIAGNOSIS — M79.671 RIGHT FOOT PAIN: ICD-10-CM

## 2024-03-12 DIAGNOSIS — M20.11 HAV (HALLUX ABDUCTO VALGUS), RIGHT: Primary | ICD-10-CM

## 2024-03-12 PROCEDURE — 73630 X-RAY EXAM OF FOOT: CPT | Performed by: PODIATRIST

## 2024-03-12 PROCEDURE — 99203 OFFICE O/P NEW LOW 30 MIN: CPT | Performed by: PODIATRIST

## 2024-03-12 NOTE — PROGRESS NOTES
[facility] Podiatry  Progress Note    Steve Rizzo is a 48 year old female.   Chief Complaint   Patient presents with    Bunions     Right big toe - onset a few years ago - has more pain lately walking barefoot - rates pain as 8/10 on and off - worse with yoga or more walking - no x-rays - states she feels a burning pain on the upper lateral aspect of the toe          HPI:     This is a pleasant female that presents to clinic today due to right foot bunion pain.  She states it has been worsening about 2 years ago.  She has been stretching it which does help and did buy an bunion brace.          Allergies: Metaxalone and Penicillins   Current Outpatient Medications   Medication Sig Dispense Refill    Magnesium Oxide -Mg Supplement (MAGNESIUM-OXIDE) 400 (240 Mg) MG Oral Tab Take 1 tablet (400 mg total) by mouth daily. 90 tablet 0    cetirizine 10 MG Oral Tab Take 1 tablet (10 mg total) by mouth daily. 90 tablet 1    naproxen 500 MG Oral Tab Take 1 tablet (500 mg total) by mouth 2 (two) times daily with meals. 180 tablet 3    fluticasone propionate 50 MCG/ACT Nasal Suspension 2 sprays by Nasal route daily. 1 each 3      History reviewed. No pertinent past medical history.   Past Surgical History:   Procedure Laterality Date    HAND/FINGER SURGERY UNLISTED      pin-- finger fx, LSF pin    LAPAROSCOPY,DIAGNOSTIC  01/2019    OTHER SURGICAL HISTORY      3 R wrist surgeries, cyst removed    OTHER SURGICAL HISTORY      breast implants    TONSILLECTOMY        Family History   Problem Relation Age of Onset    Colon Polyps Mother     Colon Cancer Maternal Grandfather       Social History     Socioeconomic History    Marital status: Single   Tobacco Use    Smoking status: Never    Smokeless tobacco: Never   Vaping Use    Vaping Use: Never used   Substance and Sexual Activity    Alcohol use: Yes     Comment: rarely    Drug use: Never           REVIEW OF SYSTEMS:   Denies nausea, fever, chills  No calf pain  No other  muscle or joint aches  Denies chest pain or shortness of breath.      EXAM:   LMP 12/11/2023 (Approximate)     Constitutional:   Patient in no apparent distress. Well kept Of normal body habitus. Alert and oriented to person, place, and time.  Integumentary examination:   There are no varicosities.   Skin appears moist, warm, and supple with positive hair growth.   There are no color changes. No open lesions. No macerations, No Hyperkeratotic lesions. Nails are of normal thickness and appearance.  Vascular examination:   DP pulse is 2/4  PT pulse is 2/4  Capillary refill is immediate  Edema is not present bilateral.  Temperature warm proximally to warm distally bilateral.  Neurological examination:   Vibratory (128-Hz tuning fork) sensation is present to right and is present to left.  Sharp/dull is present to right and is present to left.  Musculoskeletal examination:  Muscle Strength is 5/5.    Right moderate HAV deformity   Adequate right hallux DF at the MPJ without pain or crepitus      LABS & IMAGING:     Lab Results   Component Value Date    GLU 87 10/23/2023    BUN 9 10/23/2023    CREATSERUM 0.76 10/23/2023    BUNCREA 11.8 10/23/2023    ANIONGAP 7 10/23/2023    GFRAA 121 03/25/2022    GFRNAA 105 03/25/2022    CA 9.0 10/23/2023     10/23/2023    K 3.9 10/23/2023     10/23/2023    CO2 26.0 10/23/2023    OSMOCALC 292 10/23/2023        Lab Results   Component Value Date     10/23/2023    A1C 5.2 10/23/2023        No results found.     ASSESSMENT AND PLAN:   Diagnoses and all orders for this visit:    Hav (hallux abducto valgus), right    Right foot pain        Plan:     Discussed the etiology of this condition as well as both conservative and surgical treatment options.  Discussed conservative measures to include wide shoes, extra depth shoes, padding, offloading, orthotics, anti-inflammatory medication, and/or steroid injections.    Ordered right foot full WB xrays  Pt will get Hoka shoes and  will try OTC powerstep inserts    RTC 3 weeks for xray review.  May consider surgical discussion if no improvement.  She may benefit from DMO vs lapidus bunionectomy pending xrays.      No follow-ups on file.    Fabian De La Fuente DPM  3/12/2024

## 2024-04-10 ENCOUNTER — HOSPITAL ENCOUNTER (OUTPATIENT)
Dept: MAMMOGRAPHY | Age: 49
Discharge: HOME | End: 2024-04-10
Attending: STUDENT IN AN ORGANIZED HEALTH CARE EDUCATION/TRAINING PROGRAM
Payer: MEDICAID

## 2024-04-10 DIAGNOSIS — Z12.31 ENCOUNTER FOR SCREENING MAMMOGRAM FOR MALIGNANT NEOPLASM OF BREAST: ICD-10-CM

## 2024-04-10 PROCEDURE — 77067 SCR MAMMO BI INCL CAD: CPT | Performed by: STUDENT IN AN ORGANIZED HEALTH CARE EDUCATION/TRAINING PROGRAM

## 2024-04-10 PROCEDURE — 77063 BREAST TOMOSYNTHESIS BI: CPT | Performed by: STUDENT IN AN ORGANIZED HEALTH CARE EDUCATION/TRAINING PROGRAM

## 2024-05-13 ENCOUNTER — HOSPITAL ENCOUNTER (OUTPATIENT)
Age: 49
Discharge: HOME | End: 2024-05-13

## 2024-05-13 VITALS
DIASTOLIC BLOOD PRESSURE: 69 MMHG | OXYGEN SATURATION: 100 % | RESPIRATION RATE: 16 BRPM | HEART RATE: 73 BPM | TEMPERATURE: 98 F | SYSTOLIC BLOOD PRESSURE: 105 MMHG

## 2024-05-13 DIAGNOSIS — L24.3 IRRITANT CONTACT DERMATITIS DUE TO COSMETICS: Primary | ICD-10-CM

## 2024-05-13 PROCEDURE — 99213 OFFICE O/P EST LOW 20 MIN: CPT | Performed by: NURSE PRACTITIONER

## 2024-05-13 RX ORDER — TRIAMCINOLONE ACETONIDE 1 MG/G
CREAM TOPICAL 2 TIMES DAILY
Qty: 15 G | Refills: 0 | Status: SHIPPED | OUTPATIENT
Start: 2024-05-13

## 2024-05-13 RX ORDER — METHYLPREDNISOLONE 4 MG/1
TABLET ORAL
Qty: 1 EACH | Refills: 0 | Status: SHIPPED | OUTPATIENT
Start: 2024-05-13

## 2024-05-13 NOTE — DISCHARGE INSTRUCTIONS
Apply the steroid cream to the rash.  Cover with a barrier cream like Eucerin.  Continue Zyrtec daily.  If still having significant itching you may start the oral steroid and supplement with Benadryl.  Do your best to avoid scratching the area.  This may take several weeks to resolve.  Recheck with your primary doctor if no improvement

## 2024-05-13 NOTE — ED INITIAL ASSESSMENT (HPI)
Pt presents with hx of using \"foot peel mask\" last evening, developed red, raised rash after removing mask.I'm unsure if I have bug bites\", per pt.     Pt took Zyrtec, Calamine lotion and Motrin

## 2024-05-13 NOTE — ED PROVIDER NOTES
Patient Seen in: [facility]      History     Chief Complaint   Patient presents with    Rash     Stated Complaint: rash on feet, swollen    Subjective:   48-year-old female with no past medical history presents from home.  Patient is here for evaluation of a rash to both of her feet.  Onset yesterday after using a foot pedal mask.  States itchy rash to the tops of both feet.  Some associated swelling.  States very itchy.  Applying calamine, taking Zyrtec and ibuprofen.  Minimal relief.  Does note that she also worked in the garden recently and believes she may have had bug bites.  No shortness of breath or oral swelling.  No fever or bodyaches.    The history is provided by the patient. No  was used.     Objective:   History reviewed. No pertinent past medical history.           Past Surgical History:   Procedure Laterality Date    Hand/finger surgery unlisted      pin-- finger fx, LSF pin    Laparoscopy,diagnostic  01/2019    Other surgical history      3 R wrist surgeries, cyst removed    Other surgical history      breast implants    Tonsillectomy                  Social History     Socioeconomic History    Marital status: Single   Tobacco Use    Smoking status: Never    Smokeless tobacco: Never   Vaping Use    Vaping status: Never Used   Substance and Sexual Activity    Alcohol use: Yes     Comment: rarely    Drug use: Never              Review of Systems    Positive for stated complaint: rash on feet, swollen  Other systems are as noted in HPI.  Constitutional and vital signs reviewed.      All other systems reviewed and negative except as noted above.    Physical Exam     ED Triage Vitals [05/13/24 1807]   /69   Pulse 73   Resp 16   Temp 98.1 °F (36.7 °C)   Temp src Temporal   SpO2 100 %   O2 Device None (Room air)       Current Vitals:   Vital Signs  BP: 105/69  Pulse: 73  Resp: 16  Temp: 98.1 °F (36.7 °C)  Temp src: Temporal    Oxygen Therapy  SpO2: 100 %  O2 Device:  None (Room air)            Physical Exam  Vitals and nursing note reviewed.   Constitutional:       General: She is not in acute distress.     Appearance: Normal appearance. She is not ill-appearing or toxic-appearing.   HENT:      Head: Normocephalic and atraumatic.      Nose: Nose normal.      Mouth/Throat:      Mouth: Mucous membranes are moist.      Pharynx: Oropharynx is clear.   Eyes:      Pupils: Pupils are equal, round, and reactive to light.   Cardiovascular:      Rate and Rhythm: Normal rate and regular rhythm.      Pulses: Normal pulses.   Pulmonary:      Effort: Pulmonary effort is normal. No respiratory distress.      Breath sounds: Normal breath sounds.      Comments: Lungs clear.  No adventitious lung sounds.  No distress.  No hypoxia.  Pulse ox 100% ra. Which is normal    Abdominal:      General: Abdomen is flat.      Palpations: Abdomen is soft.   Musculoskeletal:         General: No signs of injury. Normal range of motion.      Cervical back: Normal range of motion and neck supple.   Skin:     General: Skin is warm and dry.      Capillary Refill: Capillary refill takes less than 2 seconds.      Findings: Rash present. Rash is papular.      Comments: Slightly papular erythematous rash to dorsal aspects of both feet.  No vesicles.  No drainage.   Neurological:      General: No focal deficit present.      Mental Status: She is alert and oriented to person, place, and time.      GCS: GCS eye subscore is 4. GCS verbal subscore is 5. GCS motor subscore is 6.   Psychiatric:         Mood and Affect: Mood normal.         Behavior: Behavior normal.         Thought Content: Thought content normal.         Judgment: Judgment normal.             ED Course   Labs Reviewed - No data to display     MDM          Medical Decision Making  Rash to feet  Differential diagnosis: Contact dermatitis, allergic reaction, shingles, bug bite  Itchy papular rash to dorsal aspect of both feet after using a foot mask.  Rash  appears consistent with an irritant contact dermatitis.  Patient concerned about bug bite but does not appear consistent with isolated bug bite.  No cellulitis.  No abscess.  Not consistent with shingles  Plan of care: Topical steroids.  Barrier cream like Eucerin.  Continue Zyrtec.  If persistent itching may start Medrol Dosepak and supplement with Benadryl    Results and plan of care discussed with the patient/family. They are in agreement with discharge. They understand to follow up with their primary doctor or the referral physician for further evaluation, especially if no improvement.  Also discussed the limitations of immediate care, patient is aware that if symptoms are worse they should go to the emergency room. Verbal and written discharge instructions were given.       Problems Addressed:  Irritant contact dermatitis due to cosmetics: acute illness or injury    Risk  OTC drugs.  Prescription drug management.        Disposition and Plan     Clinical Impression:  1. Irritant contact dermatitis due to cosmetics         Disposition:  Discharge  5/13/2024  6:08 pm    Follow-up:  Gokul Smith MD  [address] [place] [id number]  [phone]    Schedule an appointment as soon as possible for a visit   As needed          Medications Prescribed:  Current Discharge Medication List        START taking these medications    Details   methylPREDNISolone (MEDROL) 4 MG Oral Tablet Therapy Pack Dosepack: take as directed  Qty: 1 each, Refills: 0      triamcinolone 0.1 % External Cream Apply topically 2 (two) times daily.  Qty: 15 g, Refills: 0

## 2024-11-13 ENCOUNTER — TELEPHONE (OUTPATIENT)
Dept: FAMILY MEDICINE CLINIC | Facility: CLINIC | Age: 49
End: 2024-11-13

## 2024-11-13 ENCOUNTER — OFFICE VISIT (OUTPATIENT)
Dept: FAMILY MEDICINE CLINIC | Facility: CLINIC | Age: 49
End: 2024-11-13
Payer: MEDICAID

## 2024-11-13 VITALS
WEIGHT: 164 LBS | SYSTOLIC BLOOD PRESSURE: 103 MMHG | DIASTOLIC BLOOD PRESSURE: 65 MMHG | HEART RATE: 63 BPM | BODY MASS INDEX: 28 KG/M2 | HEIGHT: 64 IN

## 2024-11-13 DIAGNOSIS — R20.2 PARESTHESIAS: ICD-10-CM

## 2024-11-13 DIAGNOSIS — Z12.31 ENCOUNTER FOR SCREENING MAMMOGRAM FOR MALIGNANT NEOPLASM OF BREAST: ICD-10-CM

## 2024-11-13 DIAGNOSIS — H57.89 EYE IRRITATION: ICD-10-CM

## 2024-11-13 DIAGNOSIS — L60.0 INGROWN NAIL: ICD-10-CM

## 2024-11-13 DIAGNOSIS — Z00.00 WELL ADULT EXAM: Primary | ICD-10-CM

## 2024-11-13 DIAGNOSIS — N28.9 RENAL LESION: ICD-10-CM

## 2024-11-13 DIAGNOSIS — R91.1 INCIDENTAL LUNG NODULE, LESS THAN OR EQUAL TO 3MM: ICD-10-CM

## 2024-11-13 DIAGNOSIS — Z12.11 COLON CANCER SCREENING: ICD-10-CM

## 2024-11-13 DIAGNOSIS — N39.3 STRESS INCONTINENCE: ICD-10-CM

## 2024-11-13 DIAGNOSIS — R41.840 DECREASED ATTENTION SPAN: ICD-10-CM

## 2024-11-13 DIAGNOSIS — N92.6 IRREGULAR MENSES: ICD-10-CM

## 2024-11-13 PROCEDURE — 99215 OFFICE O/P EST HI 40 MIN: CPT | Performed by: STUDENT IN AN ORGANIZED HEALTH CARE EDUCATION/TRAINING PROGRAM

## 2024-11-13 PROCEDURE — 99396 PREV VISIT EST AGE 40-64: CPT | Performed by: STUDENT IN AN ORGANIZED HEALTH CARE EDUCATION/TRAINING PROGRAM

## 2024-11-13 RX ORDER — DEXTROAMPHETAMINE SACCHARATE, AMPHETAMINE ASPARTATE MONOHYDRATE, DEXTROAMPHETAMINE SULFATE AND AMPHETAMINE SULFATE 2.5; 2.5; 2.5; 2.5 MG/1; MG/1; MG/1; MG/1
10 CAPSULE, EXTENDED RELEASE ORAL EVERY MORNING
Qty: 30 CAPSULE | Refills: 0 | Status: SHIPPED | OUTPATIENT
Start: 2024-11-13 | End: 2024-12-13

## 2024-11-13 RX ORDER — OLOPATADINE HYDROCHLORIDE 2 MG/ML
1 SOLUTION/ DROPS OPHTHALMIC 2 TIMES DAILY
Qty: 1 EACH | Refills: 0 | Status: SHIPPED | OUTPATIENT
Start: 2024-11-13

## 2024-11-13 NOTE — TELEPHONE ENCOUNTER
amphetamine-dextroamphetamine ER (ADDERALL XR) 10 MG Oral Capsule SR 24 Hr, Take 1 capsule (10 mg total) by mouth every morning., Disp: 30 capsule, Rfl: 0

## 2024-11-13 NOTE — PROGRESS NOTES
HPI:    Patient ID: Steve Rizzo is a 49 year old female.    HPI  Pt presenting for routine physical exam. No significant chronic medical problems. Past medical/surgical history, family history, and social history were reviewed.     Reports ongoing urinary leakage since last delivery 14yrs ago  Distant h/o recurrent UTI  Worse with sudden movements, coffee intake    Reports recent menses changes over last 6 months  Fluctuating flow, lasting longer  Notes new mood changes  Reports prior hormonal birth control (depo, patch, ring, pill) with weight gain   Complains of intermittent b/l feet stabbing pain, brief    Notes ongoing poor focus  Previously tried to connect with  but unable due to coverage  Puppies keeping her up at night  Denies SH/SI/HI    Reports Left great toe pain since having pedicure a month ago  H/o ingrown toenail    Reports recurrent eye irritation related to environmental exposures    Review of Systems   A comprehensive 10 point review of systems was completed.  Pertinent positives and negatives noted in the the HPI.       Current Outpatient Medications   Medication Sig Dispense Refill    Olopatadine HCl 0.2 % Ophthalmic Solution Apply 1 drop to eye in the morning and 1 drop before bedtime. 1 each 0    amphetamine-dextroamphetamine ER (ADDERALL XR) 10 MG Oral Capsule SR 24 Hr Take 1 capsule (10 mg total) by mouth every morning. 30 capsule 0    Magnesium Oxide -Mg Supplement (MAGNESIUM-OXIDE) 400 (240 Mg) MG Oral Tab Take 1 tablet (400 mg total) by mouth daily. 90 tablet 0    cetirizine 10 MG Oral Tab Take 1 tablet (10 mg total) by mouth daily. 90 tablet 1    naproxen 500 MG Oral Tab Take 1 tablet (500 mg total) by mouth 2 (two) times daily with meals. 180 tablet 3    fluticasone propionate 50 MCG/ACT Nasal Suspension 2 sprays by Nasal route daily. 1 each 3     Allergies:Allergies[1]   Vitals:    11/13/24 0844   BP: 103/65   Pulse: 63   Weight: 164 lb (74.4 kg)   Height: 5' 4\" (1.626 m)        Body mass index is 28.15 kg/m².   PHYSICAL EXAM:   Physical Exam  Vitals reviewed.   Constitutional:       General: She is not in acute distress.     Appearance: Normal appearance. She is well-developed.   HENT:      Head: Normocephalic and atraumatic.      Right Ear: Tympanic membrane, ear canal and external ear normal.      Left Ear: Tympanic membrane, ear canal and external ear normal.   Eyes:      Conjunctiva/sclera: Conjunctivae normal.   Neck:      Thyroid: No thyroid mass or thyroid tenderness.   Cardiovascular:      Rate and Rhythm: Normal rate and regular rhythm.      Pulses: Normal pulses.      Heart sounds: Normal heart sounds, S1 normal and S2 normal. No murmur heard.  Pulmonary:      Effort: Pulmonary effort is normal. No respiratory distress.      Breath sounds: Normal breath sounds. No wheezing, rhonchi or rales.   Abdominal:      General: Bowel sounds are normal.      Palpations: Abdomen is soft.      Tenderness: There is no abdominal tenderness. There is no guarding or rebound.   Musculoskeletal:      Cervical back: Normal range of motion and neck supple. No muscular tenderness.      Right lower leg: No edema.      Left lower leg: No edema.   Lymphadenopathy:      Cervical: No cervical adenopathy.   Skin:     General: Skin is warm and dry.      Coloration: Skin is not jaundiced.   Neurological:      General: No focal deficit present.      Mental Status: She is alert and oriented to person, place, and time. Mental status is at baseline.   Psychiatric:         Attention and Perception: Attention normal.         Mood and Affect: Mood normal.         Behavior: Behavior normal. Behavior is cooperative.         Cognition and Memory: Cognition normal.             ASSESSMENT/PLAN:   1. Well adult exam  Discussed preventative screenings  - Pap 10/2023  - Cscope ordered  - mammogram ordered  - will check labs as below  - encouraged to continue diet/exercise for overall wellness  - follow-up with eye  doctor annually  - follow-up with dentist every 6 months  - return yearly for physicals  - annual flu shot  - tetanus booster every 10yrs  - TSH W Reflex To Free T4; Future  - CBC, Platelet; No Differential; Future  - Comp Metabolic Panel (14); Future  - Hemoglobin A1C; Future  - Lipid Panel; Future    2. Colon cancer screening  - Gastro GI Telephone Colon Screen; Future    3. Encounter for screening mammogram for malignant neoplasm of breast  - [facility] MATTIE 2D+3D SCREENING BILAT (CPT=77067/66019); Future    4. Incidental lung nodule, less than or equal to 3mm  Prior imaging 11/2023 reviewed  Notable for 2 micronodules in RML -- due for 1yr recall  - CT CHEST (CPT=71250); Future    5. Renal lesion  Incidental 6mm Left kidney lesion, with subsequent renal US   Due for 1yr recall for surveillance  - CT ABDOMEN (W+WO) (CPT=74170); Future    6. Ingrown nail  Encouraged continued gentle hygiene, soapy water soaks  Follow-up with Podiatry  - Podiatry Referral - In Network    7. Irregular menses  Discussed DDx, likely perimenopause  Discussed management options -- pt requesting IUD  Will check GC/Chl prior to placement  - Chlamydia/Gc Amplification; Future    8. Paresthesias  Discussed DDx  Will check labs  - Vitamin D; Future  - Ferritin; Future  - Iron And Tibc; Future  - Vitamin B12; Future    9. Stress incontinence  Discussed treatment options  Provided with pelvic floor home exercises  Consider PFPT for continued management  - Pelvic Floor Therapy - [place] Location    10. Eye irritation  Avoid triggers as able  Gentle hygiene as able  Will trial Pataday dosing  - Olopatadine HCl 0.2 % Ophthalmic Solution; Apply 1 drop to eye in the morning and 1 drop before bedtime.  Dispense: 1 each; Refill: 0    11. Decreased attention Span  Discussed treatment options  Will trial Adderall XR -- discussed side effects, dose titration, etc  - discussed red flags for urgent reevaluation- amphetamine-dextroamphetamine ER (ADDERALL XR) 10  MG Oral Capsule SR 24 Hr; Take 1 capsule (10 mg total) by mouth every morning.  Dispense: 30 capsule; Refill: 0    Follow-up 12/2 for IUD placement, surveillance. Pt verbalized understanding and agrees with plan.    Orders Placed This Encounter   Procedures    TSH W Reflex To Free T4    CBC, Platelet; No Differential    Comp Metabolic Panel (14)    Hemoglobin A1C    Lipid Panel    Vitamin D    Ferritin    Iron And Tibc    Vitamin B12    Chlamydia/Gc Amplification       Meds This Visit:  Requested Prescriptions     Signed Prescriptions Disp Refills    Olopatadine HCl 0.2 % Ophthalmic Solution 1 each 0     Sig: Apply 1 drop to eye in the morning and 1 drop before bedtime.    amphetamine-dextroamphetamine ER (ADDERALL XR) 10 MG Oral Capsule SR 24 Hr 30 capsule 0     Sig: Take 1 capsule (10 mg total) by mouth every morning.       Imaging & Referrals:  PELVIC FLOOR THERAPY - INTERNAL  PODIATRY - INTERNAL  OP REFERRAL TO [facility] TELEPHONE COLON SCREEN  CT CHEST (CPT=71250)  CT ABDOMEN (W+WO) (CPT=74170)  [facility] MATTIE 2D+3D SCREENING BILAT (CPT=77067/66315)         ID#[id number]         [1]   Allergies  Allergen Reactions    Metaxalone RASH    Penicillins RASH     Hives

## 2024-11-19 ENCOUNTER — LAB ENCOUNTER (OUTPATIENT)
Dept: LAB | Age: 49
End: 2024-11-19
Attending: STUDENT IN AN ORGANIZED HEALTH CARE EDUCATION/TRAINING PROGRAM
Payer: MEDICAID

## 2024-11-19 DIAGNOSIS — R20.2 PARESTHESIAS: ICD-10-CM

## 2024-11-19 DIAGNOSIS — Z00.00 WELL ADULT EXAM: ICD-10-CM

## 2024-11-19 DIAGNOSIS — N92.6 IRREGULAR MENSES: ICD-10-CM

## 2024-11-19 LAB
ALBUMIN SERPL-MCNC: 4.2 G/DL (ref 3.2–4.8)
ALBUMIN/GLOB SERPL: 1.4 {RATIO} (ref 1–2)
ALP LIVER SERPL-CCNC: 57 U/L
ALT SERPL-CCNC: 11 U/L
ANION GAP SERPL CALC-SCNC: 5 MMOL/L (ref 0–18)
AST SERPL-CCNC: 17 U/L (ref ?–34)
BILIRUB SERPL-MCNC: 0.7 MG/DL (ref 0.3–1.2)
BUN BLD-MCNC: 10 MG/DL (ref 9–23)
BUN/CREAT SERPL: 12.5 (ref 10–20)
C TRACH DNA SPEC QL NAA+PROBE: NEGATIVE
CALCIUM BLD-MCNC: 9.3 MG/DL (ref 8.7–10.4)
CHLORIDE SERPL-SCNC: 106 MMOL/L (ref 98–112)
CHOLEST SERPL-MCNC: 175 MG/DL (ref ?–200)
CO2 SERPL-SCNC: 28 MMOL/L (ref 21–32)
CREAT BLD-MCNC: 0.8 MG/DL
DEPRECATED HBV CORE AB SER IA-ACNC: 50 NG/ML
DEPRECATED RDW RBC AUTO: 43.8 FL (ref 35.1–46.3)
EGFRCR SERPLBLD CKD-EPI 2021: 91 ML/MIN/1.73M2 (ref 60–?)
ERYTHROCYTE [DISTWIDTH] IN BLOOD BY AUTOMATED COUNT: 12.9 % (ref 11–15)
EST. AVERAGE GLUCOSE BLD GHB EST-MCNC: 108 MG/DL (ref 68–126)
FASTING PATIENT LIPID ANSWER: YES
FASTING STATUS PATIENT QL REPORTED: YES
GLOBULIN PLAS-MCNC: 3 G/DL (ref 2–3.5)
GLUCOSE BLD-MCNC: 85 MG/DL (ref 70–99)
HBA1C MFR BLD: 5.4 % (ref ?–5.7)
HCT VFR BLD AUTO: 40.7 %
HDLC SERPL-MCNC: 72 MG/DL (ref 40–59)
HGB BLD-MCNC: 13.5 G/DL
IRON SATN MFR SERPL: 42 %
IRON SERPL-MCNC: 173 UG/DL
LDLC SERPL CALC-MCNC: 91 MG/DL (ref ?–100)
MCH RBC QN AUTO: 30.5 PG (ref 26–34)
MCHC RBC AUTO-ENTMCNC: 33.2 G/DL (ref 31–37)
MCV RBC AUTO: 92.1 FL
N GONORRHOEA DNA SPEC QL NAA+PROBE: NEGATIVE
NONHDLC SERPL-MCNC: 103 MG/DL (ref ?–130)
OSMOLALITY SERPL CALC.SUM OF ELEC: 286 MOSM/KG (ref 275–295)
PLATELET # BLD AUTO: 275 10(3)UL (ref 150–450)
POTASSIUM SERPL-SCNC: 4.5 MMOL/L (ref 3.5–5.1)
PROT SERPL-MCNC: 7.2 G/DL (ref 5.7–8.2)
RBC # BLD AUTO: 4.42 X10(6)UL
SODIUM SERPL-SCNC: 139 MMOL/L (ref 136–145)
TIBC SERPL-MCNC: 416 UG/DL (ref 250–425)
TRANSFERRIN SERPL-MCNC: 279 MG/DL (ref 250–380)
TRIGL SERPL-MCNC: 60 MG/DL (ref 30–149)
TSI SER-ACNC: 0.69 UIU/ML (ref 0.55–4.78)
VIT B12 SERPL-MCNC: 261 PG/ML (ref 211–911)
VIT D+METAB SERPL-MCNC: 27.4 NG/ML (ref 30–100)
VLDLC SERPL CALC-MCNC: 10 MG/DL (ref 0–30)
WBC # BLD AUTO: 6.1 X10(3) UL (ref 4–11)

## 2024-11-19 PROCEDURE — 82306 VITAMIN D 25 HYDROXY: CPT

## 2024-11-19 PROCEDURE — 82607 VITAMIN B-12: CPT

## 2024-11-19 PROCEDURE — 85027 COMPLETE CBC AUTOMATED: CPT

## 2024-11-19 PROCEDURE — 84443 ASSAY THYROID STIM HORMONE: CPT

## 2024-11-19 PROCEDURE — 82728 ASSAY OF FERRITIN: CPT

## 2024-11-19 PROCEDURE — 80061 LIPID PANEL: CPT

## 2024-11-19 PROCEDURE — 87591 N.GONORRHOEAE DNA AMP PROB: CPT

## 2024-11-19 PROCEDURE — 83540 ASSAY OF IRON: CPT

## 2024-11-19 PROCEDURE — 36415 COLL VENOUS BLD VENIPUNCTURE: CPT

## 2024-11-19 PROCEDURE — 83036 HEMOGLOBIN GLYCOSYLATED A1C: CPT

## 2024-11-19 PROCEDURE — 84466 ASSAY OF TRANSFERRIN: CPT

## 2024-11-19 PROCEDURE — 87491 CHLMYD TRACH DNA AMP PROBE: CPT

## 2024-11-19 PROCEDURE — 80053 COMPREHEN METABOLIC PANEL: CPT

## 2024-12-04 ENCOUNTER — OFFICE VISIT (OUTPATIENT)
Dept: FAMILY MEDICINE CLINIC | Facility: CLINIC | Age: 49
End: 2024-12-04
Payer: MEDICAID

## 2024-12-04 VITALS
WEIGHT: 158 LBS | DIASTOLIC BLOOD PRESSURE: 67 MMHG | HEART RATE: 75 BPM | HEIGHT: 64 IN | BODY MASS INDEX: 26.98 KG/M2 | SYSTOLIC BLOOD PRESSURE: 101 MMHG

## 2024-12-04 DIAGNOSIS — F41.9 ANXIETY: ICD-10-CM

## 2024-12-04 DIAGNOSIS — R41.840 DECREASED ATTENTION SPAN: ICD-10-CM

## 2024-12-04 DIAGNOSIS — Z30.09 ENCOUNTER FOR COUNSELING REGARDING CONTRACEPTION: ICD-10-CM

## 2024-12-04 DIAGNOSIS — N92.6 IRREGULAR MENSES: Primary | ICD-10-CM

## 2024-12-04 DIAGNOSIS — Z53.8 UNSUCCESSFUL ATTEMPT TO INSERT INTRAUTERINE DEVICE (IUD): ICD-10-CM

## 2024-12-04 LAB
CONTROL LINE PRESENT WITH A CLEAR BACKGROUND (YES/NO): YES YES/NO
KIT LOT #: NORMAL NUMERIC
PREGNANCY TEST, URINE: NEGATIVE

## 2024-12-04 PROCEDURE — 81025 URINE PREGNANCY TEST: CPT | Performed by: STUDENT IN AN ORGANIZED HEALTH CARE EDUCATION/TRAINING PROGRAM

## 2024-12-04 PROCEDURE — 99214 OFFICE O/P EST MOD 30 MIN: CPT | Performed by: STUDENT IN AN ORGANIZED HEALTH CARE EDUCATION/TRAINING PROGRAM

## 2024-12-04 RX ORDER — DEXTROAMPHETAMINE SACCHARATE, AMPHETAMINE ASPARTATE MONOHYDRATE, DEXTROAMPHETAMINE SULFATE AND AMPHETAMINE SULFATE 2.5; 2.5; 2.5; 2.5 MG/1; MG/1; MG/1; MG/1
10 CAPSULE, EXTENDED RELEASE ORAL EVERY MORNING
Qty: 30 CAPSULE | Refills: 0 | Status: SHIPPED | OUTPATIENT
Start: 2024-12-04 | End: 2025-01-03

## 2024-12-04 RX ORDER — DEXTROAMPHETAMINE SACCHARATE, AMPHETAMINE ASPARTATE MONOHYDRATE, DEXTROAMPHETAMINE SULFATE AND AMPHETAMINE SULFATE 2.5; 2.5; 2.5; 2.5 MG/1; MG/1; MG/1; MG/1
10 CAPSULE, EXTENDED RELEASE ORAL EVERY MORNING
Qty: 30 CAPSULE | Refills: 0 | Status: SHIPPED | OUTPATIENT
Start: 2025-01-02 | End: 2025-02-01

## 2024-12-04 RX ORDER — MELATONIN
1 DAILY
Qty: 90 TABLET | Refills: 3 | Status: SHIPPED | OUTPATIENT
Start: 2024-12-04

## 2024-12-05 NOTE — PROGRESS NOTES
HPI:    Patient ID: Steve Rizzo is a 49 year old female.    HPI  Pt presenting for IUD placement.    H/o irregular menses  H/o dysmenorrhea  Reports recent menses changes over last 6 months  Fluctuating flow, lasting longer  Notes new mood changes  Reports prior hormonal birth control (depo, patch, ring, pill) with weight gain   Complains of intermittent b/l feet stabbing pain, brief  Of note, reports prior LEEP procedures  States she had difficulty achieving vaginal delivery due to incomplete dilation    H/o ADD  Started on Adderall XR 10mg  Notes significant improvement in focus, mood, sleep  Admits to Magnesium dosing inconsistency    Review of Systems   A comprehensive 10 point review of systems was completed.  Pertinent positives and negatives noted in the the HPI.       Current Outpatient Medications   Medication Sig Dispense Refill    amphetamine-dextroamphetamine ER (ADDERALL XR) 10 MG Oral Capsule SR 24 Hr Take 1 capsule (10 mg total) by mouth every morning. 30 capsule 0    Magnesium Oxide -Mg Supplement (MAGNESIUM-OXIDE) 400 (240 Mg) MG Oral Tab Take 1 tablet (400 mg total) by mouth daily. 90 tablet 3    [START ON 1/2/2025] amphetamine-dextroamphetamine ER (ADDERALL XR) 10 MG Oral Capsule SR 24 Hr Take 1 capsule (10 mg total) by mouth every morning. 30 capsule 0    cetirizine 10 MG Oral Tab Take 1 tablet (10 mg total) by mouth daily. 90 tablet 1    naproxen 500 MG Oral Tab Take 1 tablet (500 mg total) by mouth 2 (two) times daily with meals. 180 tablet 3    fluticasone propionate 50 MCG/ACT Nasal Suspension 2 sprays by Nasal route daily. 1 each 3     Allergies:Allergies[1]   Vitals:    12/04/24 1003   BP: 101/67   Pulse: 75   Weight: 158 lb (71.7 kg)   Height: 5' 4\" (1.626 m)       Body mass index is 27.12 kg/m².   PHYSICAL EXAM:   Physical Exam  Vitals reviewed.   Constitutional:       General: She is not in acute distress.  Eyes:      Conjunctiva/sclera: Conjunctivae normal.   Cardiovascular:       Rate and Rhythm: Normal rate.   Pulmonary:      Effort: Pulmonary effort is normal.   Genitourinary:     General: Normal vulva.      Exam position: Lithotomy position.      Vagina: Normal.      Comments: External os stenosis  Neurological:      General: No focal deficit present.      Mental Status: She is alert.   Psychiatric:         Mood and Affect: Mood normal.         Behavior: Behavior normal.             ASSESSMENT/PLAN:   1. Irregular menses  - POC Urine pregnancy test [88427]  - [facility] Referral - [facility])    2. Encounter for counseling regarding contraception  - POC Urine pregnancy test [90657]  - [facility] Referral - [facility])    3. Unsuccessful attempt to insert intrauterine device (IUD)  The appropriate time out was taken. A bimanual exam was performed to determine the position of the uterus. The speculum was placed. The vagina and cervix were sterilized with Betadine in the usual manner and sterile technique was maintained throughout the course of the procedure. A single toothed tenaculum was applied to the anterior lip of the cervix and gentle traction applied to straighten and stabilize it. The depth of the uterus was sounded to be of appropriate depth (7cm) with thin plastic sound, however unable to pass metal sound into external os, likely due to prior abnormal Pap management and subsequent os stenosis.  Counseled on alternative contraceptive/menses management options  Pt prefers to follow-up with Gyne for continued evaluation/management  - [facility] Referral - [facility])    4. Decreased attention Span  Improved, plan to continue current dosing  I have reviewed the [place] Prescription Monitoring Program. This patient is appropriate for Adderall XR refill.  - amphetamine-dextroamphetamine ER (ADDERALL XR) 10 MG Oral Capsule SR 24 Hr; Take 1 capsule (10 mg total) by mouth every morning.  Dispense: 30 capsule; Refill: 0  - amphetamine-dextroamphetamine ER  (ADDERALL XR) 10 MG Oral Capsule SR 24 Hr; Take 1 capsule (10 mg total) by mouth every morning.  Dispense: 30 capsule; Refill: 0    5. Anxiety  Stable, continue to monitor  - discussed red flags for urgent reevaluation  - Magnesium Oxide -Mg Supplement (MAGNESIUM-OXIDE) 400 (240 Mg) MG Oral Tab; Take 1 tablet (400 mg total) by mouth daily.  Dispense: 90 tablet; Refill: 3    Pt verbalized understanding and agrees with plan.    Spent 39 minutes obtaining HPI and physical exam, evaluating pt history, discussing treatment options, and completing documentation.    Orders Placed This Encounter   Procedures    POC Urine pregnancy test [61094]       Meds This Visit:  Requested Prescriptions     Signed Prescriptions Disp Refills    amphetamine-dextroamphetamine ER (ADDERALL XR) 10 MG Oral Capsule SR 24 Hr 30 capsule 0     Sig: Take 1 capsule (10 mg total) by mouth every morning.    Magnesium Oxide -Mg Supplement (MAGNESIUM-OXIDE) 400 (240 Mg) MG Oral Tab 90 tablet 3     Sig: Take 1 tablet (400 mg total) by mouth daily.    amphetamine-dextroamphetamine ER (ADDERALL XR) 10 MG Oral Capsule SR 24 Hr 30 capsule 0     Sig: Take 1 capsule (10 mg total) by mouth every morning.       Imaging & Referrals:  [facility] - INTERNAL         ID#[id number]       [1]   Allergies  Allergen Reactions    Metaxalone RASH    Penicillins RASH     Hives

## 2024-12-06 ENCOUNTER — TELEPHONE (OUTPATIENT)
Dept: FAMILY MEDICINE CLINIC | Facility: CLINIC | Age: 49
End: 2024-12-06

## 2024-12-06 NOTE — TELEPHONE ENCOUNTER
Patient called stating she received an automated voicemail stating that a prior authorization was denied for her. I do not see any recent information about a PA being submitted other than the one for her medication in November that was approved. She is requesting a callback.

## 2024-12-06 NOTE — TELEPHONE ENCOUNTER
Called and discussed medication. The call she received was a unknown number and it stated \"claim.\"  Informed her that call did not come from the office. Verbalized understanding.

## 2024-12-13 ENCOUNTER — TELEPHONE (OUTPATIENT)
Dept: ADMINISTRATIVE | Facility: HOSPITAL | Age: 49
End: 2024-12-13

## 2024-12-13 NOTE — TELEPHONE ENCOUNTER
Good Afternoon, Please be advised that the CT ABDOMEN (W+WO) (CPT=74170)  has been denied by the Patient Health plan.  According to Guillermina, Dr. Sanchez can call and initiate a P2P to be done to see if the Denial can be overturned.  All clinicals has been submitted. Case#[id number] Tel# [phone]    Thanks,  Amy    Denial Reason:

## 2024-12-16 ENCOUNTER — HOSPITAL ENCOUNTER (OUTPATIENT)
Dept: CT IMAGING | Age: 49
Discharge: HOME | End: 2024-12-16
Attending: STUDENT IN AN ORGANIZED HEALTH CARE EDUCATION/TRAINING PROGRAM
Payer: MEDICAID

## 2024-12-16 DIAGNOSIS — N28.9 RENAL LESION: ICD-10-CM

## 2024-12-16 PROCEDURE — 74170 CT ABD WO CNTRST FLWD CNTRST: CPT | Performed by: STUDENT IN AN ORGANIZED HEALTH CARE EDUCATION/TRAINING PROGRAM

## 2024-12-27 ENCOUNTER — TELEPHONE (OUTPATIENT)
Dept: FAMILY MEDICINE CLINIC | Facility: CLINIC | Age: 49
End: 2024-12-27

## 2024-12-27 NOTE — TELEPHONE ENCOUNTER
Recommend inperson evaluation, agree with IC as able  Otherwise consider Robitussin and supportive care

## 2024-12-27 NOTE — TELEPHONE ENCOUNTER
Name and  verified. Patient states her daughter developed a cough in the middle of the night and the patient states she feels like her lungs are starting to become irritated and she feels like she is starting to get sick as well. The patient states she is leaving for [place] tomorrow morning and the immediate care wait times are very long. She is asking if she is able to get a prescription for a cough medication? She is going to call around to other immediate cares to see what their wait times are or states she is willing to go to immediate care in the morning if she has to.

## 2024-12-28 NOTE — TELEPHONE ENCOUNTER
Patient returned phone call. I transferred her call to the on-call Dr. I was unsure if I should just send a message to triage to let you know she called back or have the on-call Dr talk to her.

## 2024-12-30 NOTE — TELEPHONE ENCOUNTER
Patient has not read message sent; I called patient and she states she is feeling well now, symptoms have improved. She is in [place] now. Patient instructed any new or worsening symptoms [reviewed] seek immediate medical attention--> local Immediate Care or Emergency Department. Patient verbalized understanding. No further questions or concerns at this time.

## 2025-02-03 ENCOUNTER — NURSE TRIAGE (OUTPATIENT)
Dept: FAMILY MEDICINE CLINIC | Facility: CLINIC | Age: 50
End: 2025-02-03

## 2025-02-03 NOTE — TELEPHONE ENCOUNTER
Patient scheduled an appointment via [facility] for the following concern:    I just returned from [place] last night and was very sick with severe food poisoning

## 2025-02-03 NOTE — TELEPHONE ENCOUNTER
Action Requested: Summary for Provider     []  Critical Lab, Recommendations Needed  [] Need Additional Advice  []   FYI    []   Need Orders  [] Need Medications Sent to Pharmacy  []  Other     SUMMARY: patient had recently arrived back from [place] and while there she became ill with diarrhea, vomiting and intermittent stomach cramping. No fever. Her fiance has similar symptoms. She is taking in water and her symptoms have gotten better. No more vomiting or diarrhea but is still having stomach cramping. Advised per protocol. After checking, she self scheduled via my chart but accidentally scheduled for April. I re-scheduled her with JEFF WHITTEN for tomorrow at 9 am     Reason for call: Vomiting and Diarrhea  Onset: Data Unavailable                   Reason for Disposition   Travel to a foreign country in past month   Vomiting    Protocols used: Diarrhea-A-OH, Vomiting-A-OH

## 2025-02-04 ENCOUNTER — OFFICE VISIT (OUTPATIENT)
Dept: FAMILY MEDICINE CLINIC | Facility: CLINIC | Age: 50
End: 2025-02-04
Payer: MEDICAID

## 2025-02-04 ENCOUNTER — LAB ENCOUNTER (OUTPATIENT)
Dept: LAB | Age: 50
End: 2025-02-04
Payer: MEDICAID

## 2025-02-04 ENCOUNTER — LAB ENCOUNTER (OUTPATIENT)
Dept: LAB | Facility: HOSPITAL | Age: 50
End: 2025-02-04
Payer: MEDICAID

## 2025-02-04 VITALS
BODY MASS INDEX: 26.8 KG/M2 | OXYGEN SATURATION: 99 % | HEART RATE: 79 BPM | DIASTOLIC BLOOD PRESSURE: 65 MMHG | SYSTOLIC BLOOD PRESSURE: 104 MMHG | HEIGHT: 64 IN | WEIGHT: 157 LBS | TEMPERATURE: 98 F

## 2025-02-04 DIAGNOSIS — R19.7 DIARRHEA OF PRESUMED INFECTIOUS ORIGIN: ICD-10-CM

## 2025-02-04 DIAGNOSIS — R11.2 NAUSEA AND VOMITING, UNSPECIFIED VOMITING TYPE: ICD-10-CM

## 2025-02-04 DIAGNOSIS — R10.32 LEFT LOWER QUADRANT ABDOMINAL PAIN: ICD-10-CM

## 2025-02-04 DIAGNOSIS — R10.32 LLQ CRAMPING: ICD-10-CM

## 2025-02-04 DIAGNOSIS — K52.9 GASTROENTERITIS: Primary | ICD-10-CM

## 2025-02-04 LAB
ALBUMIN SERPL-MCNC: 4.1 G/DL (ref 3.2–4.8)
ALBUMIN/GLOB SERPL: 2 {RATIO} (ref 1–2)
ALP LIVER SERPL-CCNC: 62 U/L
ALT SERPL-CCNC: 22 U/L
ANION GAP SERPL CALC-SCNC: 7 MMOL/L (ref 0–18)
AST SERPL-CCNC: 25 U/L (ref ?–34)
BASOPHILS # BLD AUTO: 0.04 X10(3) UL (ref 0–0.2)
BASOPHILS NFR BLD AUTO: 0.6 %
BILIRUB SERPL-MCNC: 0.2 MG/DL (ref 0.3–1.2)
BUN BLD-MCNC: 9 MG/DL (ref 9–23)
BUN/CREAT SERPL: 12.5 (ref 10–20)
CALCIUM BLD-MCNC: 8.5 MG/DL (ref 8.7–10.4)
CHLORIDE SERPL-SCNC: 106 MMOL/L (ref 98–112)
CO2 SERPL-SCNC: 31 MMOL/L (ref 21–32)
CREAT BLD-MCNC: 0.72 MG/DL
DEPRECATED RDW RBC AUTO: 41.5 FL (ref 35.1–46.3)
EGFRCR SERPLBLD CKD-EPI 2021: 103 ML/MIN/1.73M2 (ref 60–?)
EOSINOPHIL # BLD AUTO: 0.15 X10(3) UL (ref 0–0.7)
EOSINOPHIL NFR BLD AUTO: 2.3 %
ERYTHROCYTE [DISTWIDTH] IN BLOOD BY AUTOMATED COUNT: 12.1 % (ref 11–15)
FASTING STATUS PATIENT QL REPORTED: NO
GLOBULIN PLAS-MCNC: 2.1 G/DL (ref 2–3.5)
GLUCOSE BLD-MCNC: 88 MG/DL (ref 70–99)
HCT VFR BLD AUTO: 38.2 %
HGB BLD-MCNC: 12.9 G/DL
IMM GRANULOCYTES # BLD AUTO: 0.01 X10(3) UL (ref 0–1)
IMM GRANULOCYTES NFR BLD: 0.2 %
LYMPHOCYTES # BLD AUTO: 1.85 X10(3) UL (ref 1–4)
LYMPHOCYTES NFR BLD AUTO: 28.7 %
MCH RBC QN AUTO: 31.2 PG (ref 26–34)
MCHC RBC AUTO-ENTMCNC: 33.8 G/DL (ref 31–37)
MCV RBC AUTO: 92.3 FL
MONOCYTES # BLD AUTO: 0.37 X10(3) UL (ref 0.1–1)
MONOCYTES NFR BLD AUTO: 5.7 %
NEUTROPHILS # BLD AUTO: 4.02 X10 (3) UL (ref 1.5–7.7)
NEUTROPHILS # BLD AUTO: 4.02 X10(3) UL (ref 1.5–7.7)
NEUTROPHILS NFR BLD AUTO: 62.5 %
OSMOLALITY SERPL CALC.SUM OF ELEC: 296 MOSM/KG (ref 275–295)
PLATELET # BLD AUTO: 236 10(3)UL (ref 150–450)
POTASSIUM SERPL-SCNC: 3.9 MMOL/L (ref 3.5–5.1)
PROT SERPL-MCNC: 6.2 G/DL (ref 5.7–8.2)
RBC # BLD AUTO: 4.14 X10(6)UL
SODIUM SERPL-SCNC: 144 MMOL/L (ref 136–145)
WBC # BLD AUTO: 6.4 X10(3) UL (ref 4–11)

## 2025-02-04 PROCEDURE — 87209 SMEAR COMPLEX STAIN: CPT

## 2025-02-04 PROCEDURE — 36415 COLL VENOUS BLD VENIPUNCTURE: CPT

## 2025-02-04 PROCEDURE — 80053 COMPREHEN METABOLIC PANEL: CPT

## 2025-02-04 PROCEDURE — 87798 DETECT AGENT NOS DNA AMP: CPT

## 2025-02-04 PROCEDURE — 87427 SHIGA-LIKE TOXIN AG IA: CPT

## 2025-02-04 PROCEDURE — 99214 OFFICE O/P EST MOD 30 MIN: CPT

## 2025-02-04 PROCEDURE — 87046 STOOL CULTR AEROBIC BACT EA: CPT

## 2025-02-04 PROCEDURE — 85025 COMPLETE CBC W/AUTO DIFF WBC: CPT

## 2025-02-04 PROCEDURE — 87177 OVA AND PARASITES SMEARS: CPT

## 2025-02-04 PROCEDURE — 87045 FECES CULTURE AEROBIC BACT: CPT

## 2025-02-04 PROCEDURE — 87015 SPECIMEN INFECT AGNT CONCNTJ: CPT

## 2025-02-04 RX ORDER — ONDANSETRON 4 MG/1
4 TABLET, FILM COATED ORAL EVERY 8 HOURS PRN
Qty: 20 TABLET | Refills: 0 | Status: SHIPPED | OUTPATIENT
Start: 2025-02-04 | End: 2025-02-10

## 2025-02-04 RX ORDER — DICYCLOMINE HYDROCHLORIDE 10 MG/1
10 CAPSULE ORAL 4 TIMES DAILY PRN
Qty: 40 CAPSULE | Refills: 0 | Status: SHIPPED | OUTPATIENT
Start: 2025-02-04 | End: 2025-02-14

## 2025-02-04 RX ORDER — DEXTROAMPHETAMINE SACCHARATE, AMPHETAMINE ASPARTATE MONOHYDRATE, DEXTROAMPHETAMINE SULFATE AND AMPHETAMINE SULFATE 2.5; 2.5; 2.5; 2.5 MG/1; MG/1; MG/1; MG/1
CAPSULE, EXTENDED RELEASE ORAL
COMMUNITY
Start: 2025-01-20

## 2025-02-04 NOTE — PROGRESS NOTES
Subjective:   Steve Rizzo is a 49 year old female who presents for Abdominal Pain (Past 7 days,   Diarrhea, vomiting, fell ill while in [place], nausea, getting worse )   Patient is a pleasant 49-year-old female past medical history consistent for anxiety, HSV, acid reflux, and allergic rhinitis.  Patient presents to office today new to this provider for evaluation of very sick with severe food poisoning after returning from [place].  Review of telephone encounter reveals    \"patient had recently arrived back from [place] and while there she became ill with diarrhea, vomiting and intermittent stomach cramping. No fever. Her fiance has similar symptoms. She is taking in water and her symptoms have gotten better. No more vomiting or diarrhea but is still having stomach cramping. Advised per protocol. After checking, she self scheduled via my chart but accidentally scheduled for April. I re-scheduled her with JEFF WHITTEN for tomorrow at 9 am \"    Patient states went to [facility] last Tuesday night . She was staying on a resort. [place]  she woke up in middle of the night vomiting, no blood, was bile and undigested food. She was vomiting two times per hour. Had diarrhea as well no blood. Liquid stool. Up to 15 times per day. She is still having nausea. She still has diarrhea. LLQ abd pain. Having cramping type pain. She thinks she may have gotten sick from [employer] restaurant, room temp lasanga and minestrone. Fiance is also sick. They used water from sink to brush teeth. Others at resort were sick as well.         Past Medical History:    Allergic rhinitis    I dont get them  all-of the time.    Anxiety      Past Surgical History:   Procedure Laterality Date    Colonoscopy      My guess is this was around when i was 23 years old …    D & c      Had an early miscarriage    Hand/finger surgery unlisted      pin-- finger fx, LSF pin    Laparoscopy,diagnostic  2019          Other surgical  history      3 R wrist surgeries, cyst removed    Other surgical history      breast implants    Tonsillectomy          History/Other:    Chief Complaint Reviewed and Verified  Nursing Notes Reviewed and   Verified  Tobacco Reviewed  Allergies Reviewed  Medications Reviewed    Problem List Reviewed  Medical History Reviewed  Surgical History   Reviewed  OB Status Reviewed  Family History Reviewed  Social History   Reviewed         Tobacco:  She has never smoked tobacco.    Current Outpatient Medications   Medication Sig Dispense Refill    amphetamine-dextroamphetamine ER 10 MG Oral Capsule SR 24 Hr       ondansetron (ZOFRAN) 4 mg tablet Take 1 tablet (4 mg total) by mouth every 8 (eight) hours as needed for Nausea. 20 tablet 0    dicyclomine 10 MG Oral Cap Take 1 capsule (10 mg total) by mouth 4 (four) times daily as needed. 40 capsule 0    Magnesium Oxide -Mg Supplement (MAGNESIUM-OXIDE) 400 (240 Mg) MG Oral Tab Take 1 tablet (400 mg total) by mouth daily. 90 tablet 3    cetirizine 10 MG Oral Tab Take 1 tablet (10 mg total) by mouth daily. 90 tablet 1    naproxen 500 MG Oral Tab Take 1 tablet (500 mg total) by mouth 2 (two) times daily with meals. 180 tablet 3    fluticasone propionate 50 MCG/ACT Nasal Suspension 2 sprays by Nasal route daily. (Patient not taking: Reported on 2/4/2025) 1 each 3         Review of Systems:  Review of Systems   Constitutional:  Positive for chills. Negative for fever.   Cardiovascular:  Negative for chest pain.   Gastrointestinal:  Positive for abdominal distention, abdominal pain, diarrhea, nausea and vomiting. Negative for anal bleeding.         Objective:   /65 (BP Location: Right arm, Patient Position: Sitting, Cuff Size: large)   Pulse 79   Temp 98.2 °F (36.8 °C) (Tympanic)   Ht 5' 4\" (1.626 m)   Wt 157 lb (71.2 kg)   LMP 01/20/2025 (Approximate)   SpO2 99%   BMI 26.95 kg/m²  Estimated body mass index is 26.95 kg/m² as calculated from the following:     Height as of this encounter: 5' 4\" (1.626 m).    Weight as of this encounter: 157 lb (71.2 kg).  Physical Exam  Vitals and nursing note reviewed.   Constitutional:       Appearance: Normal appearance. She is normal weight.   HENT:      Head: Normocephalic and atraumatic.   Cardiovascular:      Rate and Rhythm: Normal rate and regular rhythm.      Pulses: Normal pulses.      Heart sounds: Normal heart sounds. No murmur heard.  Pulmonary:      Effort: Pulmonary effort is normal. No respiratory distress.      Breath sounds: Normal breath sounds.   Abdominal:      General: Abdomen is flat. There is no distension.      Palpations: Abdomen is soft.      Tenderness: There is abdominal tenderness. There is no guarding or rebound.      Hernia: No hernia is present.      Comments: Tender suprapubic and LLQ   Skin:     Capillary Refill: Capillary refill takes less than 2 seconds.   Neurological:      Mental Status: She is alert and oriented to person, place, and time.           Assessment & Plan:   1. Gastroenteritis (Primary)  2. Left lower quadrant abdominal pain  -     Ova and Parasites(P); Future; Expected date: 02/04/2025  3. Diarrhea of presumed infectious origin  -     Stool Culture W/Shigatoxin; Future; Expected date: 02/04/2025  -     Norovirus, RT PCR; Future; Expected date: 02/04/2025  -     Ova and Parasites(P); Future; Expected date: 02/04/2025  -     CBC With Differential With Platelet; Future; Expected date: 02/04/2025  -     Comp Metabolic Panel (14); Future; Expected date: 02/04/2025  4. Nausea and vomiting, unspecified vomiting type  -     Ondansetron HCl; Take 1 tablet (4 mg total) by mouth every 8 (eight) hours as needed for Nausea.  Dispense: 20 tablet; Refill: 0  -     CBC With Differential With Platelet; Future; Expected date: 02/04/2025  -     Comp Metabolic Panel (14); Future; Expected date: 02/04/2025  5. LLQ cramping  -     Dicyclomine HCl; Take 1 capsule (10 mg total) by mouth 4 (four) times daily  as needed.  Dispense: 40 capsule; Refill: 0  -     Stool Culture W/Shigatoxin; Future; Expected date: 02/04/2025  -     Norovirus, RT PCR; Future; Expected date: 02/04/2025  -     Ova and Parasites(P); Future; Expected date: 02/04/2025  -     CBC With Differential With Platelet; Future; Expected date: 02/04/2025  -     Comp Metabolic Panel (14); Future; Expected date: 02/04/2025    Check stool studies  Check cbc and cmp  Patient with hx of diverticulosis and LLQ abd pain. If pain persists or worsens patient will reach out to office. May need CT abdomen to rule out diverticulitis and need to start abx vs gastroenteritis.   [place] diet, advance as tolerated  Zofran as needed  Bentyl as needed  Red flags reviewed  Follow up 1 week    Patient aware of plan of care. All questions answered to satisfaction of the patient. Patient instructed to call office or reach out via [facility] if any issues arise. For urgent issues and/or reviewed red flags please proceed to the urgent care or ER.  Also, inform the nurse practitioner with any new symptoms or medication side effects.      Return in about 1 week (around 2/11/2025).    NESSA Mitchell, 2/4/2025, 9:36 AM

## 2025-02-05 LAB — NOROVIRUS GI/GII PCR: POSITIVE

## 2025-02-06 ENCOUNTER — HOSPITAL ENCOUNTER (OUTPATIENT)
Dept: CT IMAGING | Facility: HOSPITAL | Age: 50
Discharge: HOME | End: 2025-02-06
Payer: MEDICAID

## 2025-02-06 ENCOUNTER — NURSE TRIAGE (OUTPATIENT)
Dept: FAMILY MEDICINE CLINIC | Facility: CLINIC | Age: 50
End: 2025-02-06

## 2025-02-06 DIAGNOSIS — R10.32 LEFT LOWER QUADRANT ABDOMINAL PAIN: Primary | ICD-10-CM

## 2025-02-06 DIAGNOSIS — R10.32 LEFT LOWER QUADRANT ABDOMINAL PAIN: ICD-10-CM

## 2025-02-06 DIAGNOSIS — K57.92 DIVERTICULITIS: ICD-10-CM

## 2025-02-06 DIAGNOSIS — A09 TRAVELER'S DIARRHEA: ICD-10-CM

## 2025-02-06 PROCEDURE — 74176 CT ABD & PELVIS W/O CONTRAST: CPT

## 2025-02-06 RX ORDER — AZITHROMYCIN 500 MG/1
500 TABLET, FILM COATED ORAL DAILY
Qty: 3 TABLET | Refills: 0 | Status: SHIPPED | OUTPATIENT
Start: 2025-02-06 | End: 2025-02-09

## 2025-02-06 NOTE — TELEPHONE ENCOUNTER
Patient advised of NESSA Powell's message and verbalized understanding. Patient is still awaiting stool ova and parasites test.     Patient asking if she should keep her appointment for next week 2/11/25. Please advise

## 2025-02-06 NOTE — TELEPHONE ENCOUNTER
Jair Powell, APRN  2/6/2025  2:59 PM CST       Noted results. Start azithromycin 500 q day x 3 days for travelers diarrhea

## 2025-02-06 NOTE — TELEPHONE ENCOUNTER
Please call and let patient know CT of abdomen has been ordered to rule out diverticulitis. Order has been placed for stat. If colitis/diverticulitis present will place order for ABX. Please continue supportive measures at this time. Will await final stool studies as well. Thanks

## 2025-02-06 NOTE — TELEPHONE ENCOUNTER
Spoke to CT department and since order is STAT no appointment needed. Patient is to park in the blue parking lot and go through main doors to [facility] and to tell them she is there for a STAT CT scan. Patient notified of NESSA Adam's notes below and instructions for how to get CT scan.  Patient verbalized understanding.

## 2025-02-06 NOTE — TELEPHONE ENCOUNTER
Action Requested: Summary for Provider     []  Critical Lab, Recommendations Needed  [x] Need Additional Advice  []   FYI    []   Need Orders  [] Need Medications Sent to Pharmacy  []  Other     SUMMARY: NESSA Powell - please advise    Reason for call: Diarrhea  Onset: Jan 27, 2025    Spoke to patient, full name and date of birth verified.  Patient was seen 2/4/25 by NESSA Powell.   Patient stated today is 10th day of diarrhea symptoms.     Patient's labs have not all resulted yet, patient is aware several are still in process.    Norovirus is resulted as Positive Abnormal      Patient reports she is still very sick.     Symptoms are not improving and patient stated she has so much inflammation that when she gets up to walk, she is limping.     Patient is asking for NESSA Powell's recommendation as soon as possible.

## 2025-02-10 ENCOUNTER — TELEPHONE (OUTPATIENT)
Dept: FAMILY MEDICINE CLINIC | Facility: CLINIC | Age: 50
End: 2025-02-10

## 2025-02-10 ENCOUNTER — OFFICE VISIT (OUTPATIENT)
Dept: FAMILY MEDICINE CLINIC | Facility: CLINIC | Age: 50
End: 2025-02-10

## 2025-02-10 VITALS
HEART RATE: 91 BPM | BODY MASS INDEX: 26.26 KG/M2 | HEIGHT: 64 IN | SYSTOLIC BLOOD PRESSURE: 108 MMHG | DIASTOLIC BLOOD PRESSURE: 75 MMHG | OXYGEN SATURATION: 99 % | WEIGHT: 153.81 LBS

## 2025-02-10 DIAGNOSIS — K21.9 GASTROESOPHAGEAL REFLUX DISEASE, UNSPECIFIED WHETHER ESOPHAGITIS PRESENT: ICD-10-CM

## 2025-02-10 DIAGNOSIS — A08.11 NOROVIRUS: Primary | ICD-10-CM

## 2025-02-10 DIAGNOSIS — R11.0 NAUSEA: ICD-10-CM

## 2025-02-10 PROCEDURE — 99213 OFFICE O/P EST LOW 20 MIN: CPT

## 2025-02-10 RX ORDER — SUCRALFATE 1 G/1
1 TABLET ORAL
Qty: 30 TABLET | Refills: 0 | Status: SHIPPED | OUTPATIENT
Start: 2025-02-10 | End: 2025-02-18

## 2025-02-10 RX ORDER — ONDANSETRON 4 MG/1
4 TABLET, FILM COATED ORAL EVERY 8 HOURS PRN
Qty: 20 TABLET | Refills: 0 | Status: SHIPPED | OUTPATIENT
Start: 2025-02-10

## 2025-02-10 NOTE — TELEPHONE ENCOUNTER
1230-Spoke with tuyet at [facility], scheduled peer to peer 2/11/25 at .    Jair Powell, APRN

## 2025-02-10 NOTE — PROGRESS NOTES
Subjective:   Steve Rizzo is a 49 year old female who presents for Follow - Up (Left abdominal pain/cramping, diarhea, nausea & vomiting: lower left abdominal pain/cramping got worse,looked at stool there was white stuff out of stool, still nauseous, )   Patient is a pleasant 49-year-old female past medical history consistent for anxiety, HSV, acid reflux, and allergic rhinitis.  Patient presents to office today for follow up from recent visit on 25 where she was seen after she returned from [place] with nausea and vomiting. She was positive for norovirus. Supportive measures were instituted but patient continued to have pain ay 1 week in LLQ. CT abdomen was obtained and negative for acute findings. She was started on azithromycin 500 x 3. Patient follows up today and states she is feeling better. She has less stools. Now unable to have bm. She has some intermittent nausea. No vomiting. Will refill zofran and place order for carafate. Advised bland diet and slow return.         Past Medical History:    Allergic rhinitis    I dont get them  all-of the time.    Anxiety      Past Surgical History:   Procedure Laterality Date    Colonoscopy      My guess is this was around when i was 23 years old …    D & c      Had an early miscarriage    Hand/finger surgery unlisted      pin-- finger fx, LSF pin    Laparoscopy,diagnostic  2019          Other surgical history      3 R wrist surgeries, cyst removed    Other surgical history      breast implants    Tonsillectomy          History/Other:    Chief Complaint Reviewed and Verified  Nursing Notes Reviewed and   Verified  Tobacco Reviewed  Allergies Reviewed  Medications Reviewed    Problem List Reviewed  Medical History Reviewed  Surgical History   Reviewed  OB Status Reviewed  Family History Reviewed  Social History   Reviewed         Tobacco:  She has never smoked tobacco.    Current Outpatient Medications   Medication Sig Dispense Refill     sucralfate (CARAFATE) 1 g Oral Tab Take 1 tablet (1 g total) by mouth 4 (four) times daily before meals and nightly for 30 doses. 30 tablet 0    ondansetron (ZOFRAN) 4 mg tablet Take 1 tablet (4 mg total) by mouth every 8 (eight) hours as needed for Nausea. 20 tablet 0    amphetamine-dextroamphetamine ER 10 MG Oral Capsule SR 24 Hr       dicyclomine 10 MG Oral Cap Take 1 capsule (10 mg total) by mouth 4 (four) times daily as needed. 40 capsule 0    Magnesium Oxide -Mg Supplement (MAGNESIUM-OXIDE) 400 (240 Mg) MG Oral Tab Take 1 tablet (400 mg total) by mouth daily. 90 tablet 3    cetirizine 10 MG Oral Tab Take 1 tablet (10 mg total) by mouth daily. 90 tablet 1    naproxen 500 MG Oral Tab Take 1 tablet (500 mg total) by mouth 2 (two) times daily with meals. 180 tablet 3    fluticasone propionate 50 MCG/ACT Nasal Suspension 2 sprays by Nasal route daily. (Patient not taking: Reported on 2/10/2025) 1 each 3         Review of Systems:  Review of Systems   Constitutional:  Negative for chills and fever.   Cardiovascular:  Negative for chest pain.   Gastrointestinal:  Positive for abdominal pain and nausea. Negative for diarrhea and vomiting.         Objective:   /75 (BP Location: Right arm, Patient Position: Sitting, Cuff Size: adult)   Pulse 91   Ht 5' 4\" (1.626 m)   Wt 153 lb 12.8 oz (69.8 kg)   LMP 01/20/2025 (Approximate)   SpO2 99%   BMI 26.40 kg/m²  Estimated body mass index is 26.4 kg/m² as calculated from the following:    Height as of this encounter: 5' 4\" (1.626 m).    Weight as of this encounter: 153 lb 12.8 oz (69.8 kg).  Physical Exam  Vitals and nursing note reviewed.   Constitutional:       Appearance: Normal appearance.   Cardiovascular:      Rate and Rhythm: Normal rate and regular rhythm.      Heart sounds: Normal heart sounds. No murmur heard.  Pulmonary:      Effort: Pulmonary effort is normal.      Breath sounds: Normal breath sounds.   Abdominal:      General: Abdomen is flat.       Palpations: Abdomen is soft.      Tenderness: There is abdominal tenderness. There is no guarding or rebound.   Skin:     Capillary Refill: Capillary refill takes less than 2 seconds.   Neurological:      Mental Status: She is alert and oriented to person, place, and time.           Assessment & Plan:   1. Norovirus (Primary)  -     Sucralfate; Take 1 tablet (1 g total) by mouth 4 (four) times daily before meals and nightly for 30 doses.  Dispense: 30 tablet; Refill: 0  2. Gastroesophageal reflux disease, unspecified whether esophagitis present  3. Nausea  -     Ondansetron HCl; Take 1 tablet (4 mg total) by mouth every 8 (eight) hours as needed for Nausea.  Dispense: 20 tablet; Refill: 0    Resolving norovirus  Added carafate qid prn  [place] diet, advance as tolerated.   Refill of zofran  Encouraged handwashing with daughter now sick         Return if symptoms worsen or fail to improve.    Jair Powell, NESSA, 2/10/2025, 11:15 AM

## 2025-02-11 ENCOUNTER — TELEPHONE (OUTPATIENT)
Dept: CASE MANAGEMENT | Age: 50
End: 2025-02-11

## 2025-02-11 DIAGNOSIS — R41.840 DECREASED ATTENTION SPAN: ICD-10-CM

## 2025-02-11 NOTE — TELEPHONE ENCOUNTER
Hello     We have received correspondence from the health plan regarding the request for CT abdomen and pelvis. The health plan has denied this request.  Patient completed on 2.6.25.    Clinical rationale:     Please note that the service requested for the above listed member has been denied.  Your doctor told us that you have loose stools (diarrhea). An imaging study was asked  for. We cannot approve this request because:  We need to see results of relevant laboratory studies that show this study is needed.  These labs could be blood, urine, and/or stool.  This finding was based on review of [employer] Abdomen Imaging Guidelines Section(s):  Acute and Persistent Diarrhea (up to 30 days) (AB 21.1) and 1.0 General Guidelines    Please complete peer to peer   Ph. [phone] option 4  Case #  [id number]    Thank you,  Sharlene LAINEZ   Referral specialist

## 2025-02-11 NOTE — TELEPHONE ENCOUNTER
1201-Received call from Guillermina Clark for peer to peer. Case presented. She was able to approve CT and would backdate the approval. Valid dates until 03/28/25. Approval number [id number].    NESSA Mitchell

## 2025-02-11 NOTE — TELEPHONE ENCOUNTER
Patient is requesting a refill on her adderall medication. Pharmacy: [facility] (listed)     Current Outpatient Medications   Medication Sig Dispense Refill    amphetamine-dextroamphetamine ER 10 MG Oral Capsule SR 24 Hr

## 2025-02-14 RX ORDER — DEXTROAMPHETAMINE SACCHARATE, AMPHETAMINE ASPARTATE MONOHYDRATE, DEXTROAMPHETAMINE SULFATE AND AMPHETAMINE SULFATE 2.5; 2.5; 2.5; 2.5 MG/1; MG/1; MG/1; MG/1
10 CAPSULE, EXTENDED RELEASE ORAL EVERY MORNING
Qty: 30 CAPSULE | Refills: 0 | Status: SHIPPED | OUTPATIENT
Start: 2025-02-14 | End: 2025-03-16

## 2025-02-14 NOTE — TELEPHONE ENCOUNTER
Please Review. Protocol Failed; No Protocol   01/20/2025 12/19/2024 11/20/2024  LAST WRITTEN: 12/04/2024    Requested Prescriptions   Pending Prescriptions Disp Refills    amphetamine-dextroamphetamine ER (ADDERALL XR) 10 MG Oral Capsule SR 24 Hr 30 capsule 0     Sig: Take 1 capsule (10 mg total) by mouth every morning.       Controlled Substance Medication Failed - 2/14/2025 10:06 AM        Failed - This medication is a controlled substance - forward to provider to refill        Failed - Medication is active on med list               Future Appointments         Provider Department Appt Notes    In 1 month ADO DEXA RM1; ADO ROBERT RM1 [facility] [place]           Recent Outpatient Visits              4 days ago Norovirus    [facility], Jair Smith APRN    Office Visit    1 week ago Gastroenteritis    [facility], Jair Smith APRN    Office Visit    2 months ago [facility]    Office Visit    3 months ago Well adult exam    [facility]    Office Visit    11 months ago Hav (hallux abducto valgus), right    [facility], [place], Eric Hal, DPM    Office Visit

## 2025-02-19 ENCOUNTER — TELEPHONE (OUTPATIENT)
Dept: FAMILY MEDICINE CLINIC | Facility: CLINIC | Age: 50
End: 2025-02-19

## 2025-02-19 NOTE — TELEPHONE ENCOUNTER
RN noticed that patient has not yet seen message in [employer] from NESSA Powell.   RN called and informed patient of reply and recommendations sent.   Patient thanked us for the call - she will log in to see the message.

## 2025-02-19 NOTE — TELEPHONE ENCOUNTER
NESSA Powell - please advise    Spoke to patient, full name and date of birth verified.  Patient saw NESSA Powell on 2/10/25 for severe symptoms [place]     Patient cannot get out of trip back to [facility] - she will be leaving Wednesday 2/26/25 and will be there for 8 days.     Condition update:  Patient is still not back to normal.   She had bad diarrhea last night.     Patient was planning to meet with  in [facility], but she has cancelled those plans and is going to get  in the  due to all of these health problems.     Patient is afraid she will have worsening of symptoms while she is in [facility], she would like to know if preventative antibiotic could be prescribed for her.     Patient is planning to refill Zofran on 2/25/25, this is the soonest she can refill due to insurance coverage.     Patient confirmed pharmacy is [id number] in [place].    Patient was informed NESSA Powell is out of the office today.

## 2025-03-17 ENCOUNTER — OFFICE VISIT (OUTPATIENT)
Dept: FAMILY MEDICINE CLINIC | Facility: CLINIC | Age: 50
End: 2025-03-17

## 2025-03-17 ENCOUNTER — HOSPITAL ENCOUNTER (OUTPATIENT)
Dept: GENERAL RADIOLOGY | Age: 50
Discharge: HOME | End: 2025-03-17
Attending: STUDENT IN AN ORGANIZED HEALTH CARE EDUCATION/TRAINING PROGRAM
Payer: MEDICAID

## 2025-03-17 VITALS
HEART RATE: 91 BPM | HEIGHT: 64 IN | DIASTOLIC BLOOD PRESSURE: 76 MMHG | WEIGHT: 155.38 LBS | SYSTOLIC BLOOD PRESSURE: 113 MMHG | BODY MASS INDEX: 26.53 KG/M2

## 2025-03-17 DIAGNOSIS — N92.6 IRREGULAR MENSES: ICD-10-CM

## 2025-03-17 DIAGNOSIS — G89.29 CHRONIC PAIN OF RIGHT ANKLE: ICD-10-CM

## 2025-03-17 DIAGNOSIS — R41.840 DECREASED ATTENTION SPAN: Primary | ICD-10-CM

## 2025-03-17 DIAGNOSIS — M25.571 CHRONIC PAIN OF RIGHT ANKLE: ICD-10-CM

## 2025-03-17 PROCEDURE — 99214 OFFICE O/P EST MOD 30 MIN: CPT | Performed by: STUDENT IN AN ORGANIZED HEALTH CARE EDUCATION/TRAINING PROGRAM

## 2025-03-17 PROCEDURE — 73610 X-RAY EXAM OF ANKLE: CPT | Performed by: STUDENT IN AN ORGANIZED HEALTH CARE EDUCATION/TRAINING PROGRAM

## 2025-03-17 RX ORDER — DEXTROAMPHETAMINE SACCHARATE, AMPHETAMINE ASPARTATE MONOHYDRATE, DEXTROAMPHETAMINE SULFATE AND AMPHETAMINE SULFATE 5; 5; 5; 5 MG/1; MG/1; MG/1; MG/1
20 CAPSULE, EXTENDED RELEASE ORAL EVERY MORNING
Qty: 30 CAPSULE | Refills: 0 | Status: SHIPPED | OUTPATIENT
Start: 2025-03-17

## 2025-03-17 RX ORDER — GARLIC EXTRACT 500 MG
1 CAPSULE ORAL DAILY
COMMUNITY

## 2025-03-17 NOTE — PATIENT INSTRUCTIONS
1000mg Tylenol and 600mg Ibuprofen every 6 hours (with food) as needed for pain and inflammation    OR 1000mg Tylenol and 500mg Naproxen every 12 hours (with food)

## 2025-03-17 NOTE — PROGRESS NOTES
HPI:    Patient ID: Steve Rizzo is a 49 year old female.    HPI  Pt presenting with ankle injury.    [place] end of Jan 2025  Running down stairs  Scraped back of ankle with fall  No skin break  Mid-Jan onset of limping  Worse with stairs  Reports bump on Achilles  Can do Peloton without restriction  Naproxen regularly  Waking up at night with calf pain  Shooting pain down posterior calf    LMP Jan 2025    H/o decreased attention  Adderall with notable benefit      Review of Systems   A comprehensive 10 point review of systems was completed.  Pertinent positives and negatives noted in the the HPI.       Current Outpatient Medications   Medication Sig Dispense Refill    acidophilus-pectin Oral Cap Take 1 capsule by mouth daily.      amphetamine-dextroamphetamine ER 20 MG Oral Capsule SR 24 Hr Take 1 capsule (20 mg total) by mouth every morning. 30 capsule 0    Magnesium Oxide -Mg Supplement (MAGNESIUM-OXIDE) 400 (240 Mg) MG Oral Tab Take 1 tablet (400 mg total) by mouth daily. 90 tablet 3    cetirizine 10 MG Oral Tab Take 1 tablet (10 mg total) by mouth daily. 90 tablet 1    naproxen 500 MG Oral Tab Take 1 tablet (500 mg total) by mouth 2 (two) times daily with meals. 180 tablet 3    fluticasone propionate 50 MCG/ACT Nasal Suspension 2 sprays by Nasal route daily. 1 each 3     Allergies:Allergies[1]   Vitals:    03/17/25 0910   BP: 113/76   Pulse: 91   Weight: 155 lb 6.4 oz (70.5 kg)   Height: 5' 4\" (1.626 m)       Body mass index is 26.67 kg/m².   PHYSICAL EXAM:   Physical Exam  Vitals reviewed.   Constitutional:       General: She is not in acute distress.  Eyes:      Conjunctiva/sclera: Conjunctivae normal.   Cardiovascular:      Rate and Rhythm: Normal rate and regular rhythm.      Pulses: Normal pulses.   Pulmonary:      Effort: Pulmonary effort is normal. No respiratory distress.   Feet:      Comments: Mild deformity over Right superior Achilles with mild TTP  Neurological:      General: No focal deficit  present.      Mental Status: She is alert and oriented to person, place, and time. Mental status is at baseline.   Psychiatric:         Mood and Affect: Mood normal.         Behavior: Behavior normal.             ASSESSMENT/PLAN:   1. Decreased attention Span  Improved, continue dosing  I have reviewed the [place] Prescription Monitoring Program. This patient is appropriate for Adderall refill.  - amphetamine-dextroamphetamine ER 20 MG Oral Capsule SR 24 Hr; Take 1 capsule (20 mg total) by mouth every morning.  Dispense: 30 capsule; Refill: 0    2. Irregular menses  Discussed DDx including perimenopause  Will continue to monitor for now  - discussed red flags for urgent reevaluation    3. Chronic pain of right ankle  Discussed DDx including calcification, sprain, etc  Will check XR  Anticipate follow-up with Podiatry  - encouraged gentle stretching/strengthening exercises  - encouraged to increase hydration and rest as able  - Tylenol/NSAIDs as needed, ice application  - to call with any questions or concerns  - XR ANKLE (MIN 3 VIEWS), RIGHT (CPT=73610); Future  - Podiatry Referral - [facility] ([facility])    Pt verbalized understanding and agrees with plan.    No orders of the defined types were placed in this encounter.      Meds This Visit:  Requested Prescriptions     Signed Prescriptions Disp Refills    amphetamine-dextroamphetamine ER 20 MG Oral Capsule SR 24 Hr 30 capsule 0     Sig: Take 1 capsule (20 mg total) by mouth every morning.       Imaging & Referrals:  PODIATRY - INTERNAL         ID#[id number]         [1]   Allergies  Allergen Reactions    Metaxalone RASH    Penicillins RASH     Hives

## 2025-03-19 ENCOUNTER — TELEPHONE (OUTPATIENT)
Dept: FAMILY MEDICINE CLINIC | Facility: CLINIC | Age: 50
End: 2025-03-19

## 2025-03-19 NOTE — TELEPHONE ENCOUNTER
Patient would like a call back with her ankle x-ray test results. Per the patient she had it done on Monday 3--17-25.

## 2025-03-19 NOTE — TELEPHONE ENCOUNTER
Called radiology dept. They will flag xray and have it read sooner.    Patient informed we are waiting.

## 2025-03-25 DIAGNOSIS — J30.9 ALLERGIC RHINITIS, UNSPECIFIED SEASONALITY, UNSPECIFIED TRIGGER: ICD-10-CM

## 2025-03-25 DIAGNOSIS — M79.641 RIGHT HAND PAIN: ICD-10-CM

## 2025-03-28 ENCOUNTER — OFFICE VISIT (OUTPATIENT)
Dept: PODIATRY CLINIC | Facility: CLINIC | Age: 50
End: 2025-03-28

## 2025-03-28 VITALS — WEIGHT: 155 LBS | BODY MASS INDEX: 26.46 KG/M2 | HEIGHT: 64 IN

## 2025-03-28 DIAGNOSIS — S86.001A INJURY OF RIGHT ACHILLES TENDON, INITIAL ENCOUNTER: Primary | ICD-10-CM

## 2025-03-28 PROCEDURE — 99214 OFFICE O/P EST MOD 30 MIN: CPT | Performed by: STUDENT IN AN ORGANIZED HEALTH CARE EDUCATION/TRAINING PROGRAM

## 2025-03-28 RX ORDER — MELOXICAM 15 MG/1
15 TABLET ORAL
Qty: 30 TABLET | Refills: 1 | Status: SHIPPED | OUTPATIENT
Start: 2025-03-28

## 2025-03-28 RX ORDER — FLUTICASONE PROPIONATE 50 MCG
2 SPRAY, SUSPENSION (ML) NASAL DAILY
Qty: 1 EACH | Refills: 3 | Status: SHIPPED | OUTPATIENT
Start: 2025-03-28

## 2025-03-28 RX ORDER — GABAPENTIN 100 MG/1
100 CAPSULE ORAL 3 TIMES DAILY
Qty: 90 CAPSULE | Refills: 0 | Status: SHIPPED | OUTPATIENT
Start: 2025-03-28 | End: 2025-04-27

## 2025-03-28 NOTE — TELEPHONE ENCOUNTER
Dr Sanchez -- request to refill Naproxen (pended); however it is noted that Podiatry Prescribed Meloxicam today at consultation.    Please advise if still okay for patient to be taking both types of NSAIDs at this time.     Of note: Podiatry ordered an MRI right ankle, Scheduled 4/23/25.               Fluticasone and Naproxen Refills passed per protocol.  Prescription for Fluticasone sent.   Seeking clarification on Naproxen with PCP.     Requested Prescriptions   Pending Prescriptions Disp Refills    FLUTICASONE PROPIONATE 50 MCG/ACT Nasal Suspension [Pharmacy Med Name: FLUTICASONE 50MCG NASAL SP (120) RX] 16 g 0     Sig: SHAKE LIQUID AND USE 2 SPRAYS IN EACH NOSTRIL DAILY       Allergy Medication Protocol Passed - 3/28/2025  6:32 PM        Passed - In person appointment or virtual visit in the past 12 mos or appointment in next 3 mos     Recent Outpatient Visits              Today Injury of right Achilles tendon, initial encounter    [facility] ([place], [place]), Vale Chapin DPM    Office Visit    1 week ago Decreased attention Span    [facility] [place]    Office Visit    1 month ago Norovirus    [facility] ([place] [place]Reed Patrick, APRN    Office Visit    1 month ago Gastroenteritis    [facility] [place] Patrick, APRN    Office Visit    3 months ago Irregular menses    [facility], Yasmine Mota MD    Office Visit          Future Appointments         Provider Department Appt Notes    In 2 weeks ADO DEXA RM1; ADO ROBERT RM1 [facility] - [place]     In 3 weeks ADO MRI RM1 (1.5T) [facility] MRI - [place]                     Passed - Medication is active on med list          NAPROXEN 500 MG Oral Tab [Pharmacy Med Name: NAPROXEN 500MG TABLETS] 180 tablet 3     Sig: TAKE 1  TABLET(500 MG) BY MOUTH TWICE DAILY WITH MEALS       Non-Narcotic Pain Medication Protocol Passed - 3/28/2025  6:32 PM        Passed - In person appointment or virtual visit in the past 6 mos or appointment in next 3 mos     Recent Outpatient Visits              Today Injury of right Achilles tendon, initial encounter    [facility], [place] Sellers Lillian, DPM    Office Visit    1 week ago Decreased attention Span    [facility]    Office Visit    1 month ago [facility]    Office Visit    1 month ago Gastroenteritis    [facility]    Office Visit    3 months ago Irregular menses    [facility] Marie[facility]    Office Visit          Future Appointments         Provider Department Appt Notes    In 2 weeks ADO DEXA RM1; ADO ROBERT RM1 [facility] - [place]     In 3 weeks ADO MRI RM1 (1.5T) [facility] - [place]                     Passed - Medication is active on med list           Recent Outpatient Visits              Today Injury of right Achilles tendon, initial encounter    [facility]    Office Visit    1 week ago Decreased attention Span    [facility]    Office Visit    1 month ago Norovirus    [facility]    Office Visit    1 month ago Gastroenteritis    [facility]    Office Visit    3 months ago Irregular menses    [facility]    Office Visit          Future  Appointments         Provider Department Appt Notes    In 2 weeks ADO DARLEEN RM1; ADO ROBERT RM1 [facility] - [place]     In 3 weeks ADO MRI RM1 (1.5T) [facility] MRI - [place]

## 2025-03-28 NOTE — PROGRESS NOTES
[facility]  Progress Note      Steve Rizzo is a 49 year old female.   Chief Complaint   Patient presents with    Ankle Pain     Consult - right ankle - onset around December - pt states she was going down stairs at home and scrapped her achillis tendon against the stair - pain rated 8-10 with certain movements - has been limping - has a bump - feels like she can't bear weight on the sides of her ankle - was seen by her PCP on 03/17 - xrays in system         HPI:     Patient is a pleasant 49-year-old female presents to clinic for evaluation of right Achilles tendon pain.  Patient relates that her symptoms started in December when she recalls that she was going down the stairs at home and scraped her Achilles tendon against the stair.  She now admits to pain rating an 8 out of 10 with certain movements.  Relates that she has been limping and she has a bump along her Achilles tendon.  She admits that it is hard for her to walk.  She has x-rays in the system which do not show any fractures or dislocations.  Patient is planning a trip out of state on Sunday where she will be doing a lot of walking.  Patient would also like to discuss her right first metatarsal bunion.  She relates that she was told in the past that she might need surgery with postoperative downtime of approximately 9 weeks.  Patient would like to discuss treatment options    Allergies: Metaxalone and Penicillins    Current Outpatient Medications   Medication Sig Dispense Refill    Meloxicam 15 MG Oral Tab Take 1 tablet (15 mg total) by mouth daily with dinner. 30 tablet 1    gabapentin 100 MG Oral Cap Take 1 capsule (100 mg total) by mouth 3 (three) times daily. 90 capsule 0    acidophilus-pectin Oral Cap Take 1 capsule by mouth daily.      amphetamine-dextroamphetamine ER 20 MG Oral Capsule SR 24 Hr Take 1 capsule (20 mg total) by mouth every morning. 30 capsule 0    Magnesium Oxide -Mg Supplement (MAGNESIUM-OXIDE) 400 (240 Mg) MG Oral  Tab Take 1 tablet (400 mg total) by mouth daily. 90 tablet 3    cetirizine 10 MG Oral Tab Take 1 tablet (10 mg total) by mouth daily. 90 tablet 1    naproxen 500 MG Oral Tab Take 1 tablet (500 mg total) by mouth 2 (two) times daily with meals. 180 tablet 3    fluticasone propionate 50 MCG/ACT Nasal Suspension 2 sprays by Nasal route daily. 1 each 3      Past Medical History:    Allergic rhinitis    I dont get them  all-of the time.    Anxiety      Past Surgical History:   Procedure Laterality Date    Colonoscopy      My guess is this was around when i was 23 years old …    D & c      Had an early miscarriage    Hand/finger surgery unlisted      pin-- finger fx, LSF pin    Laparoscopy,diagnostic  2019          Other surgical history      3 R wrist surgeries, cyst removed    Other surgical history      breast implants    Tonsillectomy        Family History   Problem Relation Age of Onset    Colon Polyps Mother     Obesity Mother     Cancer Father         Lung cancer and tumor around aorta    Stroke Father     Colon Cancer Maternal Grandfather     Cancer Maternal Grandfather         Colon cancer    Heart Disorder Maternal Grandfather     Heart Disorder Paternal Grandfather          suddenly of heart failure  68 years old      Social History     Socioeconomic History    Marital status: Single   Tobacco Use    Smoking status: Never     Passive exposure: Never    Smokeless tobacco: Never   Vaping Use    Vaping status: Never Used   Substance and Sexual Activity    Alcohol use: Yes     Alcohol/week: 4.0 standard drinks of alcohol     Types: 4 Glasses of wine per week     Comment: rarely    Drug use: Never           REVIEW OF SYSTEMS:     Denies nause, fever, chills  No calf pain  Denies chest pain or SOB      EXAM:   Ht 5' 4\" (1.626 m)   Wt 155 lb (70.3 kg)   LMP 2025 (Approximate)   BMI 26.61 kg/m²   GENERAL: well developed, well nourished, in no apparent distress  EXTREMITIES:   1. Integument:  Normal skin temperature and turgor  2. Vascular: Dorsalis pedis two out of four bilateral and posterior tibial pulses two out of   four bilateral, capillary refill normal.   3. Musculoskeletal: All muscle groups are graded 5 out of 5 in the foot and ankle.  Achilles tendon to right lower extremity seems intact with tenderness on palpation just proximal to the Achilles tendon insertion.  Pain with right ankle range of motion.  Lateral deviation of right hallux with prominent first metatarsal medial eminence.  Limited right first MPJ range of motion   4. Neurological: Normal sharp dull sensation; reflexes normal.      XR ANKLE (MIN 3 VIEWS), RIGHT (CPT=73610)    Result Date: 3/19/2025  CONCLUSION: Grossly normal radiographs of the right ankle.    Dictated by (CST): Shreyas Rae MD on 3/19/2025 at 3:59 PM     Finalized by (CST): Shreyas Rae MD on 3/19/2025 at 4:00 PM            ASSESSMENT AND PLAN:   Diagnoses and all orders for this visit:    Injury of right Achilles tendon, initial encounter  -     MRI ANKLE, RIGHT (CPT=73721); Future    Other orders  -     Meloxicam 15 MG Oral Tab; Take 1 tablet (15 mg total) by mouth daily with dinner.  -     gabapentin 100 MG Oral Cap; Take 1 capsule (100 mg total) by mouth 3 (three) times daily.        Plan:     Patient seen and examined and findings discussed with patient in detail.  Discussed right ankle x-rays which do not show any fractures or dislocations.  Advised patient that she needs to obtain a right ankle MRI for further evaluation of the integrity of the right Achilles tendon.  Instructed patient that she needs to be weightbearing in a cam boot.  Today in clinic we tried different sizes of a cam boot however we did not fit patient.  Advised patient to obtain a short cam boot from [employer] as shown in clinic.  Advised patient that she needs to be weightbearing with the cam boot to avoid any possible tendon ruptures.  Dispensed meloxicam and gabapentin for pain  management.  Patient to follow-up once MRI is completed.    The patient indicates understanding of these issues and agrees to the plan.        Vale Mazariegos DPM

## 2025-03-31 RX ORDER — NAPROXEN 500 MG/1
500 TABLET ORAL 2 TIMES DAILY WITH MEALS
Qty: 180 TABLET | Refills: 3 | Status: SHIPPED | OUTPATIENT
Start: 2025-03-31

## 2025-04-07 ENCOUNTER — TELEPHONE (OUTPATIENT)
Dept: PODIATRY CLINIC | Facility: CLINIC | Age: 50
End: 2025-04-07

## 2025-04-07 NOTE — TELEPHONE ENCOUNTER
Pt called.  Appointment 3-28-25.  Did not have the medical boot in the office. Advised to order on [employer].  When wearing the boot pt is having pain.  Can pt get the boot now at the office.  Pt is scheduled to have the mri 4-24-25.   Please call pt

## 2025-04-07 NOTE — TELEPHONE ENCOUNTER
Spoke with patient. Was at [place] with her daughter last week and wore the [employer] shoes and she states they were amazing. However, since she got home and stated wearing the boot she got from [employer] she has increased pain and tenderness in back by heel area. It is very tender and she states the boot feels like it is pressing into the bump on her heel that has gotten bigger. She denies feeling any kind of pop, but the pain is increased by a lot.    She would like a boot from our office as she feels the [employer] boot is hurting her and not effective.    Her MRI is not until the 23rd of April. I did advise on tactics to try to find earlier appointment.    I did advise on ice, elevation, rest right now.     We discussed pain medication. I advised on the upper limits of acetaminophen in a 24 hour period, not to take 2 NSAIDs at the same time. Verbalized understanding.

## 2025-04-07 NOTE — TELEPHONE ENCOUNTER
If patient find the [employer] shoes comfortable and is able to wear them inside the house then she can do that too. If we have a boot that fits her we can dispense one.

## 2025-04-10 DIAGNOSIS — R41.840 DECREASED ATTENTION SPAN: ICD-10-CM

## 2025-04-10 NOTE — TELEPHONE ENCOUNTER
Spoke with patient. Let her know that she can stay in the Hokas and see if they do the trick. Let her know we do have a medium boot in office at [facility] if needed. Advised to reach out with any increase in pain but otherwise to give it a week, or through the weekend and see if the shoes help. Advised to reach out to insurance to see if boot would be covered. No further questions or concerns at this time.

## 2025-04-10 NOTE — TELEPHONE ENCOUNTER
Patient is requesting a medication refill    Adderall generic brand  20 mg    [facility] in [place] confirmed preferred

## 2025-04-14 NOTE — TELEPHONE ENCOUNTER
Please review; protocol failed/No Protocol      Recent Fills: 01/20/2025, 02/18/2025, 03/17/2025    Last Rx Written: 03/17/2025    Last Office Visit: 03/17/2025

## 2025-04-16 RX ORDER — DEXTROAMPHETAMINE SACCHARATE, AMPHETAMINE ASPARTATE MONOHYDRATE, DEXTROAMPHETAMINE SULFATE AND AMPHETAMINE SULFATE 5; 5; 5; 5 MG/1; MG/1; MG/1; MG/1
20 CAPSULE, EXTENDED RELEASE ORAL EVERY MORNING
Qty: 30 CAPSULE | Refills: 0 | Status: SHIPPED | OUTPATIENT
Start: 2025-04-16

## 2025-04-23 ENCOUNTER — HOSPITAL ENCOUNTER (OUTPATIENT)
Dept: MRI IMAGING | Age: 50
Discharge: HOME | End: 2025-04-23
Attending: STUDENT IN AN ORGANIZED HEALTH CARE EDUCATION/TRAINING PROGRAM
Payer: MEDICAID

## 2025-04-23 DIAGNOSIS — S86.001A INJURY OF RIGHT ACHILLES TENDON, INITIAL ENCOUNTER: ICD-10-CM

## 2025-04-23 PROCEDURE — 73721 MRI JNT OF LWR EXTRE W/O DYE: CPT | Performed by: STUDENT IN AN ORGANIZED HEALTH CARE EDUCATION/TRAINING PROGRAM

## 2025-05-06 ENCOUNTER — OFFICE VISIT (OUTPATIENT)
Dept: PODIATRY CLINIC | Facility: CLINIC | Age: 50
End: 2025-05-06
Payer: MEDICAID

## 2025-05-06 DIAGNOSIS — S86.011A PARTIAL TEAR OF RIGHT ACHILLES TENDON, INITIAL ENCOUNTER: ICD-10-CM

## 2025-05-06 DIAGNOSIS — R41.840 DECREASED ATTENTION SPAN: ICD-10-CM

## 2025-05-06 DIAGNOSIS — M67.88 ACHILLES TENDINOSIS: Primary | ICD-10-CM

## 2025-05-06 PROCEDURE — 99214 OFFICE O/P EST MOD 30 MIN: CPT | Performed by: STUDENT IN AN ORGANIZED HEALTH CARE EDUCATION/TRAINING PROGRAM

## 2025-05-06 RX ORDER — METHYLPREDNISOLONE 4 MG/1
TABLET ORAL
Qty: 21 TABLET | Refills: 0 | Status: SHIPPED | OUTPATIENT
Start: 2025-05-06

## 2025-05-06 NOTE — TELEPHONE ENCOUNTER
Patient called to request a refill on below medication.    Patient advised she is aware she is calling it in a little early due to pharmacy shortages.     Gregory on file verified.     Medication Quantity Refills Start End   Amphetamine-Dextroamphet ER 20 MG Oral Capsule SR 24 Hr 30 capsule 0 4/16/2025 --   Sig:   Take 1 capsule (20 mg total) by mouth every morning.     Route:   Oral     Earliest Fill Date:   4/16/2025     Order #:   [id number]       
Please review; protocol failed/ has no protocol      Yomi Vega hours ago (9:36 AM)     LT  Patient called to request a refill on below medication.     Patient advised she is aware she is calling it in a little early due to pharmacy shortages.      Gregory on file verified.              Recent fills: 04/16/2025,03/17/2025  Last Rx written: 04/16/2025  Last Office Visit: 03/17/2025    Recent Visits  Date Type Provider Dept   03/17/25 Office Visit Yasmine Sanchez MD [facility] Med           
3.475

## 2025-05-06 NOTE — PROGRESS NOTES
[facility]  Progress Note      Steve Rizzo is a 49 year old female.   Chief Complaint   Patient presents with    Ankle Pain     Pt in for Right ankle pain, had MRI done- in a walking boot- still in pain 9/10 days can be worse             HPI:     Patient is a pleasant 49-year-old female presents to clinic for follow up of right Achilles tendon pain.  Patient's symptoms started in December of 2024 when she recalls that she was going down the stairs at home and scraped her Achilles tendon against the stairs.  She now admits to pain rating an 9 out of 10 with certain movements. She admits that it is hard for her to walk. Pt did obtain an MRI of the right lower extremity. She has been ambulating in a CAM boot as suggested. Denies any other recent pedal injuries or trauma.         Allergies: Metaxalone and Penicillins    Current Medications[1]   Past Medical History[2]   Past Surgical History[3]   Family History[4]   Social Hx on file[5]        REVIEW OF SYSTEMS:     Denies nause, fever, chills  No calf pain  Denies chest pain or SOB      EXAM:   [facility] 01/20/2025 (Approximate)   GENERAL: well developed, well nourished, in no apparent distress  EXTREMITIES:   1. Integument: Normal skin temperature and turgor  2. Vascular: Dorsalis pedis two out of four bilateral and posterior tibial pulses two out of   four bilateral, capillary refill normal.   3. Musculoskeletal: All muscle groups are graded 5 out of 5 in the foot and ankle.  Achilles tendon to right lower extremity seems intact with tenderness on palpation just proximal to the Achilles tendon insertion.  Pain with right ankle range of motion.    4. Neurological: Normal sharp dull sensation; reflexes normal.               ASSESSMENT AND PLAN:   Diagnoses and all orders for this visit:    Achilles tendinosis  -     Physical Therapy Referral - [place] Locations  -     DME - External    Partial tear of right Achilles tendon, initial encounter  -      Physical Therapy Referral - [place] Locations  -     DME - External    Other orders  -     methylPREDNISolone 4 MG Oral Tablet Therapy Pack; Take per package insert (instructions). Take as directed on the box        Plan:     Patient seen and examined and findings discussed with patient.  Discussed MRI findings with patient in great detail which shows interstitial partial tear of the Achilles tendon with coexisting tendinosis.  Informed patient that there is also mild posterior tibial tenosynovitis Vitas, mild thickening of the peroneal retinaculum with a chronic partial lateral tear of the peroneus longus tendon.  There are no fractures or dislocations noted to the right ankle MRI.  Discussed conservative treatment options versus surgical treatment.  Conservatively we discussed immobilization with Cam boot for at least 3 to 4 weeks.  We also discussed following physical therapy after immobilization.  Discussed completing a Medrol Dosepak as instructed.  We also discussed the possibility of surgical intervention if conservative treatment fails.  Instructed the patient on the importance of compliance weightbearing in a cam boot to avoid any further tearing of the Achilles tendon.  All patient's questions were answered to satisfaction.  Follow-up in clinic in 4 weeks.    The patient indicates understanding of these issues and agrees to the plan.        Vale Mazariegos DPM          [1]   Current Outpatient Medications   Medication Sig Dispense Refill    methylPREDNISolone 4 MG Oral Tablet Therapy Pack Take per package insert (instructions). Take as directed on the box 21 tablet 0    NAPROXEN 500 MG Oral Tab TAKE 1 TABLET(500 MG) BY MOUTH TWICE DAILY WITH MEALS 180 tablet 3    fluticasone propionate 50 MCG/ACT Nasal Suspension SHAKE LIQUID AND USE 2 SPRAYS IN EACH NOSTRIL DAILY 1 each 3    Meloxicam 15 MG Oral Tab Take 1 tablet (15 mg total) by mouth daily with dinner. 30 tablet 1    acidophilus-pectin Oral Cap  Take 1 capsule by mouth daily.      Magnesium Oxide -Mg Supplement (MAGNESIUM-OXIDE) 400 (240 Mg) MG Oral Tab Take 1 tablet (400 mg total) by mouth daily. 90 tablet 3    cetirizine 10 MG Oral Tab Take 1 tablet (10 mg total) by mouth daily. 90 tablet 1    Amphetamine-Dextroamphet ER 20 MG Oral Capsule SR 24 Hr Take 1 capsule (20 mg total) by mouth every morning. 30 capsule 0   [2]   Past Medical History:   Allergic rhinitis    I dont get them  all-of the time.    Anxiety   [3]   Past Surgical History:  Procedure Laterality Date    Colonoscopy      My guess is this was around when i was 23 years old …    D & c      Had an early miscarriage    Hand/finger surgery unlisted      pin-- finger fx, LSF pin    Laparoscopy,diagnostic  2019          Other surgical history      3 R wrist surgeries, cyst removed    Other surgical history      breast implants    Tonsillectomy     [4]   Family History  Problem Relation Age of Onset    Colon Polyps Mother     Obesity Mother     Cancer Father         Lung cancer and tumor around aorta    Stroke Father     Colon Cancer Maternal Grandfather     Cancer Maternal Grandfather         Colon cancer    Heart Disorder Maternal Grandfather     Heart Disorder Paternal Grandfather          suddenly of heart failure  69 years old   [5]   Social History  Socioeconomic History    Marital status: Single   Tobacco Use    Smoking status: Never     Passive exposure: Never    Smokeless tobacco: Never   Vaping Use    Vaping status: Never Used   Substance and Sexual Activity    Alcohol use: Yes     Alcohol/week: 4.0 standard drinks of alcohol     Types: 4 Glasses of wine per week     Comment: rarely    Drug use: Never

## 2025-05-07 RX ORDER — DEXTROAMPHETAMINE SACCHARATE, AMPHETAMINE ASPARTATE MONOHYDRATE, DEXTROAMPHETAMINE SULFATE AND AMPHETAMINE SULFATE 5; 5; 5; 5 MG/1; MG/1; MG/1; MG/1
20 CAPSULE, EXTENDED RELEASE ORAL EVERY MORNING
Qty: 30 CAPSULE | Refills: 0 | Status: SHIPPED | OUTPATIENT
Start: 2025-05-07

## 2025-05-13 ENCOUNTER — HOSPITAL ENCOUNTER (OUTPATIENT)
Dept: MAMMOGRAPHY | Age: 50
Discharge: HOME | End: 2025-05-13
Attending: STUDENT IN AN ORGANIZED HEALTH CARE EDUCATION/TRAINING PROGRAM
Payer: MEDICAID

## 2025-05-13 DIAGNOSIS — Z12.31 ENCOUNTER FOR SCREENING MAMMOGRAM FOR MALIGNANT NEOPLASM OF BREAST: ICD-10-CM

## 2025-05-13 PROCEDURE — 77063 BREAST TOMOSYNTHESIS BI: CPT | Performed by: STUDENT IN AN ORGANIZED HEALTH CARE EDUCATION/TRAINING PROGRAM

## 2025-05-13 PROCEDURE — 77067 SCR MAMMO BI INCL CAD: CPT | Performed by: STUDENT IN AN ORGANIZED HEALTH CARE EDUCATION/TRAINING PROGRAM

## 2025-06-02 ENCOUNTER — TELEPHONE (OUTPATIENT)
Dept: FAMILY MEDICINE CLINIC | Facility: CLINIC | Age: 50
End: 2025-06-02

## 2025-06-02 DIAGNOSIS — R41.840 DECREASED ATTENTION SPAN: ICD-10-CM

## 2025-06-02 RX ORDER — DEXTROAMPHETAMINE SACCHARATE, AMPHETAMINE ASPARTATE MONOHYDRATE, DEXTROAMPHETAMINE SULFATE AND AMPHETAMINE SULFATE 5; 5; 5; 5 MG/1; MG/1; MG/1; MG/1
20 CAPSULE, EXTENDED RELEASE ORAL EVERY MORNING
Qty: 30 CAPSULE | Refills: 0 | Status: SHIPPED | OUTPATIENT
Start: 2025-06-02

## 2025-06-02 NOTE — TELEPHONE ENCOUNTER
Patient calling for refill; patient states she has enough for 4 days.     Amphetamine-Dextroamphet ER 20 MG Oral Capsule SR 24 Hr; 20 mg, Every morning     [id number] [place], [place] - [address] AT [facility], [phone], [phone] [[id number]]

## 2025-06-02 NOTE — TELEPHONE ENCOUNTER
05/11/2025  LAST WRITTEN: 05/07/2025  Quantity: 30    Recent Visits  Date Type Provider Dept   03/17/25 Office Visit Yasmine Sanchez MD [facility]   12/04/24 Office Visit Yasmine Sanchez MD [facility]

## 2025-06-02 NOTE — TELEPHONE ENCOUNTER
Patient has gone to a different podiatrist rather than the one Dr. Sanchez referred her too - she did not have a good experience. She is now going to [facility] in [place].     Patient asking if there is anything she needs to do regarding medical records or referrals for this different podiatrist.     Patient will contact [facility]/Medical records for a copy of MRI of ankle.     Please call patient to advise if any specific instructions: [phone]

## 2025-06-03 NOTE — TELEPHONE ENCOUNTER
May need updated referral for current provider -- can we get name of provider so I can generate referral?

## 2025-06-03 NOTE — TELEPHONE ENCOUNTER
Spoke w/ pt- she does not need new referral at this time. Advised [facility] can fax notes to us if she would like scanned to her chart. Nothing else needed at this time.

## 2025-07-03 DIAGNOSIS — R41.840 DECREASED ATTENTION SPAN: ICD-10-CM

## 2025-07-03 RX ORDER — DEXTROAMPHETAMINE SACCHARATE, AMPHETAMINE ASPARTATE MONOHYDRATE, DEXTROAMPHETAMINE SULFATE AND AMPHETAMINE SULFATE 5; 5; 5; 5 MG/1; MG/1; MG/1; MG/1
20 CAPSULE, EXTENDED RELEASE ORAL EVERY MORNING
Qty: 30 CAPSULE | Refills: 0 | Status: SHIPPED | OUTPATIENT
Start: 2025-07-03

## 2025-07-03 NOTE — TELEPHONE ENCOUNTER
For replies, please route to pool: [facility] CENTRAL REFILLS    Please review: medication fails/has no protocol attached.    No future appointments with primary care medicine  Last office visit: 3/17/25    Recent fill dates: 6/9/25, 5/11/25, and 4/16/25  Date of  last written prescription: 6/2/25   Last dispensed quantity: #30 each and processed as a 30 day supply

## 2025-07-03 NOTE — TELEPHONE ENCOUNTER
Patient called requesting a refill on the following pharmacy:      Amphetamine-Dextroamphet ER 20 MG Oral Capsule SR 24 Hr       Per patient she has enough medication until 07/07/2025. Per patient send refill to the following pharmacy:    [facility]     [address] [place]

## 2025-08-05 DIAGNOSIS — R41.840 DECREASED ATTENTION SPAN: ICD-10-CM

## 2025-08-05 RX ORDER — DEXTROAMPHETAMINE SACCHARATE, AMPHETAMINE ASPARTATE MONOHYDRATE, DEXTROAMPHETAMINE SULFATE AND AMPHETAMINE SULFATE 5; 5; 5; 5 MG/1; MG/1; MG/1; MG/1
20 CAPSULE, EXTENDED RELEASE ORAL EVERY MORNING
Qty: 30 CAPSULE | Refills: 0 | Status: SHIPPED | OUTPATIENT
Start: 2025-09-03

## 2025-08-05 RX ORDER — DEXTROAMPHETAMINE SACCHARATE, AMPHETAMINE ASPARTATE MONOHYDRATE, DEXTROAMPHETAMINE SULFATE AND AMPHETAMINE SULFATE 5; 5; 5; 5 MG/1; MG/1; MG/1; MG/1
20 CAPSULE, EXTENDED RELEASE ORAL EVERY MORNING
Qty: 30 CAPSULE | Refills: 0 | Status: SHIPPED | OUTPATIENT
Start: 2025-08-05

## (undated) NOTE — ED AVS SNAPSHOT
[facility] Emergency Department    [address]     [place] [address]    Phone:  [phone]    Fax:  [place]   MRN: [id number]    Department:  [facility] Emergency Department   Date of Visit:  4/8/20 to serve you. You are our top priority. You were examined and treated today on an urgent basis only. This was not a substitute for ongoing medical care. Often, one Emergency Department visit does not uncover every injury or illness.  If you have been r [facility]        Pharmacy Address Phone Number   [facility]ed [address] [facility] ([address]) [address] [facility] [phone] [facility]  [phone] not sign up before the expiration date, you must request a new code. Your unique isocket Access Code: [email or web address]  Expires: 6/8/2017 12:27 AM    If you have questions, you can call [phone] to talk to our [facility] Staff.  Remember, Bridger i

## (undated) NOTE — ED AVS SNAPSHOT
[facility] Emergency Department    [address] [place] [address]    Phone:  [phone]    Fax:  Brennan Aparicio   MRN: [id number]    Department:  [facility] Emergency Department   Date of Visit:  4/8/20 and Class Registration line at [phone] or find a doctor online by visiting [email or web address].    IF THERE IS ANY CHANGE OR WORSENING OF YOUR CONDITION, CALL YOUR PRIMARY CARE PHYSICIAN AT ONCE OR RETURN IMMEDIATELY TO [address] [place] [address].     If

## (undated) NOTE — LETTER
AUTHORIZATION FOR SURGICAL OPERATION OR OTHER PROCEDURE    1. I hereby authorize Dr. Yasmine Sanchez and [facility] staff assigned to my case to perform the following operation and/or procedure at the [facility] site:    ___IUD INSERTION____________________________________________________________________      _______________________________________________________________________________________________    2.  My physician has explained the nature and purpose of the operation or other procedure, possible alternative methods of treatment, the risks involved, and the possibility of complication to me.  I acknowledge that no guarantee has been made as to the result that may be obtained.  3.  I recognize that, during the course of this operation, or other procedure, unforseen conditions may necessitate additional or different procedure than those listed above.  I, therefore, further authorize and request that the above named physician, his/her physician assistants or designees perform such procedures as are, in his/her professional opinion, necessary and desirable.  4.  Any tissue or organs removed in the operation or other procedure may be disposed of by and at the discretion of the [facility] and [facility].  5.  I understand that in the event of a medical emergency, I will be transported by local paramedics to [facility] or other hospital emergency department.  6.  I certify that I have read and fully understand the above consent to operation and/or other procedure.    7.  I acknowledge that my physician has explained sedation/analgesia administration to me including the risks and benefits.  I consent to the administration of sedation/analgesia as may be necessary or desirable in the judgement of my physician.    Witness signature: ___________________________________________________ Date:  ______/______/_____                    Time:  ________ A.M.  P.M.        Patient Name:  ______________________________________________________  (please print)      Patient signature:  ___________________________________________________             Relationship to Patient:           []  Parent    Responsible person                          []  Spouse  In case of minor or                    [] Other  _____________   Incompetent name:  __________________________________________________                               (please print)      _____________      Responsible person  In case of minor or  Incompetent signature:  _______________________________________________    Statement of Physician  My signature below affirms that prior to the time of the procedure, I have explained to the patient and/or his/her guardian, the risks and benefits involved in the proposed treatment and any reasonable alternative to the proposed treatment.  I have also explained the risks and benefits involved in the refusal of the proposed treatment and have answered the patient's questions.                        Date:  ______/______/_______  Provider                      Signature:  __________________________________________________________       Time:  ___________ A.M    P.M.

## (undated) NOTE — ED AVS SNAPSHOT
Jailene Schroeder   MRN: [id number]    Department:  [facility] Emergency Department   Date of Visit:  2/27/2018           Disclosure     Insurance plans vary and the physician(s) referred by the ER may not be covered by your plan.  Please contact yo CARE PHYSICIAN AT ONCE OR RETURN IMMEDIATELY TO THE EMERGENCY DEPARTMENT. If you have been prescribed any medication(s), please fill your prescription right away and begin taking the medication(s) as directed.   If you believe that any of the medications

## (undated) NOTE — LETTER
Date & Time: 10/23/2018, 4:26 PM  Patient: Lubna Pat  Encounter Provider(s):    Maria Teresa Uriarte MD       To Whom It May Concern:    Lubna Pat was seen and treated in our department on 10/23/2018.  She should not return to work until 10/25/201

## (undated) NOTE — ED AVS SNAPSHOT
Elly Samayoa   MRN: [id number]    Department:  [facility] Emergency Department   Date of Visit:  3/16/2019           Disclosure     Insurance plans vary and the physician(s) referred by the ER may not be covered by your plan.  Please contact yo CARE PHYSICIAN AT ONCE OR RETURN IMMEDIATELY TO THE EMERGENCY DEPARTMENT. If you have been prescribed any medication(s), please fill your prescription right away and begin taking the medication(s) as directed.   If you believe that any of the medications